# Patient Record
Sex: MALE | Race: WHITE | NOT HISPANIC OR LATINO | Employment: FULL TIME | URBAN - METROPOLITAN AREA
[De-identification: names, ages, dates, MRNs, and addresses within clinical notes are randomized per-mention and may not be internally consistent; named-entity substitution may affect disease eponyms.]

---

## 2022-05-10 ENCOUNTER — OFFICE VISIT (OUTPATIENT)
Dept: FAMILY MEDICINE | Facility: CLINIC | Age: 32
End: 2022-05-10
Payer: COMMERCIAL

## 2022-05-10 VITALS
HEART RATE: 63 BPM | TEMPERATURE: 99 F | WEIGHT: 212.5 LBS | SYSTOLIC BLOOD PRESSURE: 118 MMHG | HEIGHT: 76 IN | DIASTOLIC BLOOD PRESSURE: 62 MMHG | BODY MASS INDEX: 25.88 KG/M2 | OXYGEN SATURATION: 98 % | RESPIRATION RATE: 18 BRPM

## 2022-05-10 DIAGNOSIS — K64.4 EXTERNAL HEMORRHOID, BLEEDING: Primary | ICD-10-CM

## 2022-05-10 PROCEDURE — 99999 PR PBB SHADOW E&M-NEW PATIENT-LVL III: CPT | Mod: PBBFAC,,, | Performed by: FAMILY MEDICINE

## 2022-05-10 PROCEDURE — 99204 PR OFFICE/OUTPT VISIT, NEW, LEVL IV, 45-59 MIN: ICD-10-PCS | Mod: S$GLB,,, | Performed by: FAMILY MEDICINE

## 2022-05-10 PROCEDURE — 3008F BODY MASS INDEX DOCD: CPT | Mod: CPTII,S$GLB,, | Performed by: FAMILY MEDICINE

## 2022-05-10 PROCEDURE — 3074F PR MOST RECENT SYSTOLIC BLOOD PRESSURE < 130 MM HG: ICD-10-PCS | Mod: CPTII,S$GLB,, | Performed by: FAMILY MEDICINE

## 2022-05-10 PROCEDURE — 99204 OFFICE O/P NEW MOD 45 MIN: CPT | Mod: S$GLB,,, | Performed by: FAMILY MEDICINE

## 2022-05-10 PROCEDURE — 3008F PR BODY MASS INDEX (BMI) DOCUMENTED: ICD-10-PCS | Mod: CPTII,S$GLB,, | Performed by: FAMILY MEDICINE

## 2022-05-10 PROCEDURE — 3078F DIAST BP <80 MM HG: CPT | Mod: CPTII,S$GLB,, | Performed by: FAMILY MEDICINE

## 2022-05-10 PROCEDURE — 99999 PR PBB SHADOW E&M-NEW PATIENT-LVL III: ICD-10-PCS | Mod: PBBFAC,,, | Performed by: FAMILY MEDICINE

## 2022-05-10 PROCEDURE — 3074F SYST BP LT 130 MM HG: CPT | Mod: CPTII,S$GLB,, | Performed by: FAMILY MEDICINE

## 2022-05-10 PROCEDURE — 3078F PR MOST RECENT DIASTOLIC BLOOD PRESSURE < 80 MM HG: ICD-10-PCS | Mod: CPTII,S$GLB,, | Performed by: FAMILY MEDICINE

## 2022-05-10 RX ORDER — MUPIROCIN 20 MG/G
OINTMENT TOPICAL 3 TIMES DAILY
Qty: 30 G | Refills: 0 | Status: SHIPPED | OUTPATIENT
Start: 2022-05-10

## 2022-05-10 RX ORDER — HYDROCORTISONE 25 MG/G
OINTMENT TOPICAL 2 TIMES DAILY
Qty: 28.35 G | Refills: 0 | Status: SHIPPED | OUTPATIENT
Start: 2022-05-10

## 2022-05-10 RX ORDER — DOCUSATE SODIUM 100 MG/1
100 CAPSULE, LIQUID FILLED ORAL 2 TIMES DAILY PRN
Qty: 60 CAPSULE | Refills: 1 | Status: SHIPPED | OUTPATIENT
Start: 2022-05-10

## 2022-05-10 NOTE — PROGRESS NOTES
Health Maintenance Due   Topic     Hepatitis C Screening  Consult pcp      Lipid Panel  Consult pcp        HIV Screening      TETANUS VACCINE      COVID-19 Vaccine (2 - Pfizer series)

## 2022-05-11 ENCOUNTER — OFFICE VISIT (OUTPATIENT)
Dept: SURGERY | Facility: CLINIC | Age: 32
End: 2022-05-11
Payer: COMMERCIAL

## 2022-05-11 VITALS
BODY MASS INDEX: 25.98 KG/M2 | SYSTOLIC BLOOD PRESSURE: 121 MMHG | HEIGHT: 76 IN | WEIGHT: 213.38 LBS | DIASTOLIC BLOOD PRESSURE: 67 MMHG | HEART RATE: 74 BPM

## 2022-05-11 DIAGNOSIS — K64.4 EXTERNAL HEMORRHOID, BLEEDING: ICD-10-CM

## 2022-05-11 DIAGNOSIS — K64.5 THROMBOSED EXTERNAL HEMORRHOID: ICD-10-CM

## 2022-05-11 PROCEDURE — 99999 PR PBB SHADOW E&M-EST. PATIENT-LVL III: ICD-10-PCS | Mod: PBBFAC,,, | Performed by: COLON & RECTAL SURGERY

## 2022-05-11 PROCEDURE — 3078F DIAST BP <80 MM HG: CPT | Mod: CPTII,S$GLB,, | Performed by: COLON & RECTAL SURGERY

## 2022-05-11 PROCEDURE — 46320 REMOVAL OF HEMORRHOID CLOT: CPT | Mod: S$GLB,,, | Performed by: COLON & RECTAL SURGERY

## 2022-05-11 PROCEDURE — 99203 PR OFFICE/OUTPT VISIT, NEW, LEVL III, 30-44 MIN: ICD-10-PCS | Mod: 25,S$GLB,, | Performed by: COLON & RECTAL SURGERY

## 2022-05-11 PROCEDURE — 3078F PR MOST RECENT DIASTOLIC BLOOD PRESSURE < 80 MM HG: ICD-10-PCS | Mod: CPTII,S$GLB,, | Performed by: COLON & RECTAL SURGERY

## 2022-05-11 PROCEDURE — 1159F MED LIST DOCD IN RCRD: CPT | Mod: CPTII,S$GLB,, | Performed by: COLON & RECTAL SURGERY

## 2022-05-11 PROCEDURE — 3074F PR MOST RECENT SYSTOLIC BLOOD PRESSURE < 130 MM HG: ICD-10-PCS | Mod: CPTII,S$GLB,, | Performed by: COLON & RECTAL SURGERY

## 2022-05-11 PROCEDURE — 88304 PR  SURG PATH,LEVEL III: ICD-10-PCS | Mod: 26,,, | Performed by: PATHOLOGY

## 2022-05-11 PROCEDURE — 1159F PR MEDICATION LIST DOCUMENTED IN MEDICAL RECORD: ICD-10-PCS | Mod: CPTII,S$GLB,, | Performed by: COLON & RECTAL SURGERY

## 2022-05-11 PROCEDURE — 99999 PR PBB SHADOW E&M-EST. PATIENT-LVL III: CPT | Mod: PBBFAC,,, | Performed by: COLON & RECTAL SURGERY

## 2022-05-11 PROCEDURE — 3008F BODY MASS INDEX DOCD: CPT | Mod: CPTII,S$GLB,, | Performed by: COLON & RECTAL SURGERY

## 2022-05-11 PROCEDURE — 46320 PR EXCISION THROMBOSED HEMORRHOID, EXTERNAL: ICD-10-PCS | Mod: S$GLB,,, | Performed by: COLON & RECTAL SURGERY

## 2022-05-11 PROCEDURE — 3074F SYST BP LT 130 MM HG: CPT | Mod: CPTII,S$GLB,, | Performed by: COLON & RECTAL SURGERY

## 2022-05-11 PROCEDURE — 88304 TISSUE EXAM BY PATHOLOGIST: CPT | Performed by: PATHOLOGY

## 2022-05-11 PROCEDURE — 99203 OFFICE O/P NEW LOW 30 MIN: CPT | Mod: 25,S$GLB,, | Performed by: COLON & RECTAL SURGERY

## 2022-05-11 PROCEDURE — 3008F PR BODY MASS INDEX (BMI) DOCUMENTED: ICD-10-PCS | Mod: CPTII,S$GLB,, | Performed by: COLON & RECTAL SURGERY

## 2022-05-11 PROCEDURE — 88304 TISSUE EXAM BY PATHOLOGIST: CPT | Mod: 26,,, | Performed by: PATHOLOGY

## 2022-05-11 NOTE — PROGRESS NOTES
"CRS Office Visit History and Physical    Referring Md:   Aaareferral Self  No address on file    SUBJECTIVE:     Chief Complaint: anal pain, hemorrhoids    History of Present Illness:  Patient is a 31 y.o. male presents with pain and swelling starting last FRI.  Started seeing blood over weekend - [facility].  Swelling better, pain improved  BMs generally easy, 1-2 per day.  No straining.  No protrusion      No past medical history on file.    No past surgical history on file.    Review of patient's allergies indicates:   Allergen Reactions    Penicillins Hives and Rash       Current Outpatient Medications on File Prior to Visit   Medication Sig Dispense Refill    docusate sodium (COLACE) 100 MG capsule Take 1 capsule (100 mg total) by mouth 2 (two) times daily as needed for Constipation. 60 capsule 1    hydrocortisone 2.5 % ointment Apply topically 2 (two) times daily. 28.35 g 0    mupirocin (BACTROBAN) 2 % ointment Apply topically 3 (three) times daily. 30 g 0     No current facility-administered medications on file prior to visit.       No family history on file.          Review of Systems:  ROS:  GENERAL: No fever, chills, fatigability or weight loss.  Integument:  No rashes, redness, icterus  CHEST: Denies DAVIS, cyanosis, wheezing, cough and sputum production.  CARDIOVASCULAR: Denies chest pain, PND, orthopnea or reduced exercise tolerance.  GI:  Denies abd pain, dysphagia, nausea, vomiting, no hematemesis, no rectal pain  : Denies burning on urination, no hematuria, no bacteriuria  MSK:  No deformities, swelling, joint pain swelling  Neurologic:  No HAs, seizures, weakness, paresthesias, gait problems      OBJECTIVE:     Vital Signs (Most Recent)  /67 (BP Location: Left arm, Patient Position: Sitting, BP Method: Medium (Automatic))   Pulse 74   Ht 6' 4" (1.93 m)   Wt 96.8 kg (213 lb 6.5 oz)   BMI 25.98 kg/m²     Physical Exam:  General: White male in no distress   Skin/ Sclera anicteric  HEENT: " anicteric, normocephalic, extraocular movements intact   Neck trachea midline  Chest symmetric, nl excursions, no retractions  Respiratory: respirations are even and unlabored     Anorectal:   Inspection       Thrombosed external hemorrhoid, right lateral position  Extremities: Warm dry and intact.  NO CCE  Neuro: alert and oriented x 4.  Moves all extremities.         ASSESSMENT/PLAN:   Thrombosed external hemorrhoid      Recommend  Thrombosis removed - see below  Soak BID  OV in 6 weeks    Procedure note  Removed thrombosed external hemorrhoid  Moshe/ Mahesh  Tolerated procedure well

## 2022-05-16 NOTE — PROGRESS NOTES
Subjective:       Patient ID: Mumtaz Barragan is a 31 y.o. male.    Chief Complaint: Hemorrhoids      HPI  31-year-old male presents for hemorrhoid.  Patient feels he has external hemorrhoid has been occasionally bleeding.  States he had this in the past.      Review of Systems   Constitutional: Negative.  Negative for activity change and unexpected weight change.   HENT: Negative.  Negative for hearing loss, rhinorrhea and trouble swallowing.    Eyes: Negative for discharge and visual disturbance.   Respiratory: Negative.  Negative for chest tightness and wheezing.    Cardiovascular: Negative.  Negative for chest pain and palpitations.   Gastrointestinal: Positive for blood in stool and diarrhea. Negative for constipation and vomiting.   Endocrine: Negative.  Negative for polydipsia and polyuria.   Genitourinary: Negative.  Negative for difficulty urinating, hematuria and urgency.   Musculoskeletal: Negative.  Negative for arthralgias, joint swelling and neck pain.   Neurological: Negative.  Negative for weakness and headaches.   Psychiatric/Behavioral: Negative.  Negative for confusion and dysphoric mood.          History reviewed. No pertinent past medical history.  History reviewed. No pertinent surgical history.  History reviewed. No pertinent family history.  Social History     Socioeconomic History    Marital status: Single       Current Outpatient Medications:     docusate sodium (COLACE) 100 MG capsule, Take 1 capsule (100 mg total) by mouth 2 (two) times daily as needed for Constipation., Disp: 60 capsule, Rfl: 1    hydrocortisone 2.5 % ointment, Apply topically 2 (two) times daily., Disp: 28.35 g, Rfl: 0    mupirocin (BACTROBAN) 2 % ointment, Apply topically 3 (three) times daily., Disp: 30 g, Rfl: 0   Objective:      Vitals:    05/10/22 0940   BP: 118/62   BP Location: Right arm   Patient Position: Sitting   BP Method: Small (Manual)   Pulse: 63   Resp: 18   Temp: 98.6 °F (37 °C)   TempSrc: Oral   SpO2:  "98%   Weight: 96.4 kg (212 lb 8.4 oz)   Height: 6' 4" (1.93 m)       Physical Exam  Constitutional:       General: He is not in acute distress.     Appearance: He is not diaphoretic.   HENT:      Head: Normocephalic and atraumatic.   Eyes:      Conjunctiva/sclera: Conjunctivae normal.   Pulmonary:      Effort: Pulmonary effort is normal.   Genitourinary:     Rectum: External hemorrhoid present.   Musculoskeletal:         General: Normal range of motion.      Cervical back: Neck supple.   Skin:     Findings: No rash.   Neurological:      Mental Status: He is alert and oriented to person, place, and time.   Psychiatric:         Behavior: Behavior normal.         Thought Content: Thought content normal.         Judgment: Judgment normal.            Assessment:       1. External hemorrhoid, bleeding        Plan:       External hemorrhoid, bleeding  -     Ambulatory referral/consult to Gastroenterology; Future; Expected date: 05/17/2022  -     docusate sodium (COLACE) 100 MG capsule; Take 1 capsule (100 mg total) by mouth 2 (two) times daily as needed for Constipation.  Dispense: 60 capsule; Refill: 1  -     hydrocortisone 2.5 % ointment; Apply topically 2 (two) times daily.  Dispense: 28.35 g; Refill: 0  -     mupirocin (BACTROBAN) 2 % ointment; Apply topically 3 (three) times daily.  Dispense: 30 g; Refill: 0    Given steroid cream a patient was advised to follow-up with GI            Patient note was created using [employer]ue[employer].  Any errors in syntax or even information may not have been identified and edited on initial review prior to signing this note.  "

## 2022-05-17 LAB
FINAL PATHOLOGIC DIAGNOSIS: NORMAL
Lab: NORMAL

## 2022-06-21 NOTE — PROGRESS NOTES
"  PI:  Mumtaz Barragan is a 31 y.o. male with history of thrombosed external hemorrhoid    Feels well.  No further pain, bleeding  BMs regular.  No prolapse      No past medical history on file.     No past surgical history on file.    Review of patient's allergies indicates:   Allergen Reactions    Penicillins Hives and Rash       No family history on file.    Social History     Socioeconomic History    Marital status: Single       ROS:  GENERAL: No fever, chills, fatigability or weight loss.  Integument: No rashes, redness, icterus  CHEST: Denies DAVIS, cyanosis, wheezing, cough and sputum production.  CARDIOVASCULAR: Denies chest pain, PND, orthopnea or reduced exercise tolerance.  GI: Denies abd pain, dysphagia, nausea, vomiting, no hematemesis   : Denies burning on urination, no hematuria, no bacteriuria  MSK: No deformities, swelling, joint pain swelling  Neurologic: No HAs, seizures, weakness, paresthesias, gait problems    PE:  General appearance well  BP (!) 144/75 (BP Location: Right arm, Patient Position: Sitting, BP Method: Large (Automatic))   Pulse 64   Ht 6' 4" (1.93 m)   Wt 94.8 kg (209 lb)   BMI 25.44 kg/m²     Sclera/ Skin anicteric  AT NC EOMI  Neck supple trachea midline   Chest symmetric, nl excursion, no retractions, breathing comfortably  EXT - no CCE  Neuro:  Mood/ affect nl, alert and oriented x 3, moves all ext's, gait nl    Rectal  Inspection           MARY nl tone, no mass      Assessment:  Resolved external thrombosed hemorrhoid    Plan:  High fiber diet - 25 grams per day.  Emphasis on whole grain breads such as double fiber wheat bread and high fiber cereals and bars.    Drink 8 glasses of water per day 64 - 96 oz per day  Fiber supplements such as KONSYL, METAMUCIL can be taken 1-2 x per day  Regular exercise - 30 min brisk walking 5 days per week      Anoscopy Procedure Note:   Verbal consent obtained    Indications:  History of thrombosed ext hemorrhoid    Post procedure " diagnosis:  Mild internal hemorrhoids    Procedure: anoscopy    Surgeon REKHA    Assistant: MA    Specimen none     Findings:  Resolved external hemorrhoid.  Wound healed.      Right posterior hemorrhoid grade 1  Right anterior hemorrhoid grade 1  Left lateral hemorrhoid grade 2    Pt tolerated procedure well.      No complications.

## 2022-06-22 ENCOUNTER — OFFICE VISIT (OUTPATIENT)
Dept: SURGERY | Facility: CLINIC | Age: 32
End: 2022-06-22
Payer: COMMERCIAL

## 2022-06-22 VITALS
WEIGHT: 209 LBS | SYSTOLIC BLOOD PRESSURE: 144 MMHG | HEIGHT: 76 IN | HEART RATE: 64 BPM | DIASTOLIC BLOOD PRESSURE: 75 MMHG | BODY MASS INDEX: 25.45 KG/M2

## 2022-06-22 DIAGNOSIS — K64.5 THROMBOSED EXTERNAL HEMORRHOIDS: Primary | ICD-10-CM

## 2022-06-22 PROCEDURE — 99213 OFFICE O/P EST LOW 20 MIN: CPT | Mod: 25,S$GLB,, | Performed by: COLON & RECTAL SURGERY

## 2022-06-22 PROCEDURE — 1159F PR MEDICATION LIST DOCUMENTED IN MEDICAL RECORD: ICD-10-PCS | Mod: CPTII,S$GLB,, | Performed by: COLON & RECTAL SURGERY

## 2022-06-22 PROCEDURE — 46600 PR DIAG2STIC A2SCOPY: ICD-10-PCS | Mod: S$GLB,,, | Performed by: COLON & RECTAL SURGERY

## 2022-06-22 PROCEDURE — 3077F PR MOST RECENT SYSTOLIC BLOOD PRESSURE >= 140 MM HG: ICD-10-PCS | Mod: CPTII,S$GLB,, | Performed by: COLON & RECTAL SURGERY

## 2022-06-22 PROCEDURE — 3008F PR BODY MASS INDEX (BMI) DOCUMENTED: ICD-10-PCS | Mod: CPTII,S$GLB,, | Performed by: COLON & RECTAL SURGERY

## 2022-06-22 PROCEDURE — 99213 PR OFFICE/OUTPT VISIT, EST, LEVL III, 20-29 MIN: ICD-10-PCS | Mod: 25,S$GLB,, | Performed by: COLON & RECTAL SURGERY

## 2022-06-22 PROCEDURE — 3008F BODY MASS INDEX DOCD: CPT | Mod: CPTII,S$GLB,, | Performed by: COLON & RECTAL SURGERY

## 2022-06-22 PROCEDURE — 3078F DIAST BP <80 MM HG: CPT | Mod: CPTII,S$GLB,, | Performed by: COLON & RECTAL SURGERY

## 2022-06-22 PROCEDURE — 3078F PR MOST RECENT DIASTOLIC BLOOD PRESSURE < 80 MM HG: ICD-10-PCS | Mod: CPTII,S$GLB,, | Performed by: COLON & RECTAL SURGERY

## 2022-06-22 PROCEDURE — 99999 PR PBB SHADOW E&M-EST. PATIENT-LVL III: ICD-10-PCS | Mod: PBBFAC,,, | Performed by: COLON & RECTAL SURGERY

## 2022-06-22 PROCEDURE — 99999 PR PBB SHADOW E&M-EST. PATIENT-LVL III: CPT | Mod: PBBFAC,,, | Performed by: COLON & RECTAL SURGERY

## 2022-06-22 PROCEDURE — 3077F SYST BP >= 140 MM HG: CPT | Mod: CPTII,S$GLB,, | Performed by: COLON & RECTAL SURGERY

## 2022-06-22 PROCEDURE — 46600 DIAGNOSTIC ANOSCOPY SPX: CPT | Mod: S$GLB,,, | Performed by: COLON & RECTAL SURGERY

## 2022-06-22 PROCEDURE — 1159F MED LIST DOCD IN RCRD: CPT | Mod: CPTII,S$GLB,, | Performed by: COLON & RECTAL SURGERY

## 2023-10-16 ENCOUNTER — PATIENT MESSAGE (OUTPATIENT)
Dept: ADMINISTRATIVE | Facility: HOSPITAL | Age: 33
End: 2023-10-16
Payer: COMMERCIAL

## 2023-10-16 ENCOUNTER — PATIENT OUTREACH (OUTPATIENT)
Dept: ADMINISTRATIVE | Facility: HOSPITAL | Age: 33
End: 2023-10-16
Payer: COMMERCIAL

## 2023-10-16 NOTE — PROGRESS NOTES
Population Health Chart Review & Patient Outreach Details:     Reason for Outreach Encounter:     [x]  Non-Compliant Report   []  Payor Report ([employer], PHN, BCBS, MSSP, MCIP, UHC, etc.)   []  Pre-Visit Chart Review     Updates Requested / Reviewed:     [x]  Care Everywhere    []     []  External Sources ([facility], [facility], DIS, etc.)   []  Care Team Updated    Patient Outreach Method:    [x]  Telephone Outreach Completed   [] Successful   [x] Left Voicemail   [] Unable to Contact (wrong number, no voicemail)  [x]  [facility] Outreach Sent  []  Letter Outreach Mailed  []  Fax Sent for External Records  []  External Records Upload    Health Maintenance Topics Addressed and Outreach Outcomes / Actions Taken:        []      Breast Cancer Screening []  Mammo Scheduled      []  External Records Requested     []  Added Reminder to Complete to Upcoming Primary Care Appt Notes     []  Patient Declined     []  Patient Will Call Back to Schedule     []  Patient Will Schedule with External Provider / Order Routed if Applicable             []       Cervical Cancer Screening []  Pap Scheduled      []  External Records Requested     []  Added Reminder to Complete to Upcoming Primary Care Appt Notes     []  Patient Declined     []  Patient Will Call Back to Schedule     []  Patient Will Schedule with External Provider               []          Colorectal Cancer Screening []  Colonoscopy Case Request or Referral Placed     []  External Records Requested     []  Added Reminder to Complete to Upcoming Primary Care Appt Notes     []  Patient Declined     []  Patient Will Call Back to Schedule     []  Patient Will Schedule with External Provider     []  Fit Kit Mailed (add the SmartPhrase under additional notes)     []  Reminded Patient to Complete Home Test             []      Diabetic Eye Exam []  Eye Camera Scheduled or Optometry Referral Placed     []  External Records Requested     []  Added Reminder to Complete to  Upcoming Primary Care Appt Notes     []  Patient Declined     []  Patient Will Call Back to Schedule     []  Patient Will Schedule with External Provider             []      Blood Pressure Control []  Primary Care Follow Up Visit Scheduled     []  Remote Blood Pressure Reading Captured     []  Added Reminder to Complete to Upcoming Primary Care Appt Notes     []  Patient Declined     []  Patient Will Call Back / Patient Will Send Portal Message with Reading     []  Patient Will Call Back to Schedule Provider Visit             []       HbA1c & Other Labs []  Lab Appt Scheduled for Due Labs     []  Primary Care Follow Up Visit Scheduled      []  Reminded Patient to Complete Home Test     []  Added Reminder to Complete to Upcoming Primary Care Appt Notes     []  Patient Declined     []  Patient Will Call Back to Schedule     []  Patient Will Schedule with External Provider / Order Routed if Applicable           [x]    Schedule Primary Care Appt []  Primary Care Appt Scheduled     []  Patient Declined     []  Patient Will Call Back to Schedule     []  Pt Established with External Provider & Updated Care Team             []      Medication Adherence []  Primary Care Appointment Scheduled     []  Added Reminder to Upcoming Primary Care Appt Notes     []  Patient Reminded to  Prescription     []  Patient Declined, Provider Notified if Needed     []  Sent Provider Message to Review and/or Add Exclusion to Problem List             []      Osteoporosis Screening []  DXA Appointment Scheduled     []  External Records Requested     []  Added Reminder to Complete to Upcoming Primary Care Appt Notes     []  Patient Declined     []  Patient Will Call Back to Schedule     []  Patient Will Schedule with External Provider / Order Routed if Applicable     Additional Care Coordinator Notes:         Further Action Needed If Patient Returns Outreach:     Need to schedule visit with pcp.

## 2024-10-08 NOTE — PROGRESS NOTES
Subjective:          Chief Complaint: Mumtaz Barragan is a 33 y.o. male who had no chief complaint listed for this encounter.    Mumtaz Barragan is a 33 y.o. male,  here for evaluation of his bilateral posterior Hip. The pain started 3-4 months months ago after playing squash and is becoming progressively worse. Pain is located over (points to) ischium. He reports that the pain is a 6 /10 aching pain today. He reports none previous treatments. Pain is affecting ADLs and limiting desired level of activity. Denies numbness, tingling, radiation, and inability to bear weight.  Pain is 6 /10 at its worst    Mechanical symptoms: none  Subjective instability: (--)   Worse with playing squash  Better with rest  Nocturnal symptoms: (--)    No previous surgeries or trauma             Review of Systems   Constitutional: Negative for chills and fever.   HENT:  Negative for congestion and sore throat.    Eyes:  Negative for discharge and double vision.   Cardiovascular:  Negative for chest pain, palpitations and syncope.   Respiratory:  Negative for cough and shortness of breath.    Endocrine: Negative for cold intolerance and heat intolerance.   Skin:  Negative for dry skin and rash.   Musculoskeletal:  Positive for joint pain.   Gastrointestinal:  Negative for abdominal pain, nausea and vomiting.   Neurological:  Negative for focal weakness, numbness and paresthesias.   All other systems reviewed and are negative.                  Objective:        General: Mumtaz is well-developed, well-nourished, appears stated age, in no acute distress, alert and oriented to time, place and person.         General Musculoskeletal Exam   Gait: normal   Pelvic Obliquity: none        Right Hip Exam     Range of Motion   Abduction:  30   Adduction:  20   Extension:  0   Flexion:  120   External rotation:  30   Internal rotation:  10     Muscle Strength   The patient has normal right hip strength.    Tests   Pain w/ forced internal  rotation (PREM): absent  Pain w/ forced external rotation (FADIR): absent    Other   Sensation: normal    Comments:  Popliteal angle 15 degrees  Right posterior thigh tenderness  Left Hip Exam     Range of Motion   Abduction:  30   Adduction:  20   Extension:  0   Flexion:  120   External rotation:  30   Internal rotation: 10     Muscle Strength   The patient has normal left hip strength.     Tests   Pain w/ forced internal rotation (PREM): absent  Pain w/ forced external rotation (FADIR): absent    Other   Sensation: normal    Comments:  Popliteal angle 15 degrees          Vascular Exam     Right Pulses  Dorsalis Pedis:      2+  Posterior Tibial:      2+        Left Pulses  Dorsalis Pedis:      2+  Posterior Tibial:      2+              Assessment:       No diagnosis found.       Plan:       1. RTC in as needed with Dr. João Mathews. Arriaga Hip Score was filled out today in clinic. Patient will fill out Arriaga Hip Score on return.     2. Medications: Refills of the following Rx were sent to patients preferred Pharmacy:  Meloxicam 15mg Tab    3. [facility]       4. HEP: N/A    5. Procedures/Procedural Planning:   N/A    6. DME: N/A    7. 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 Visit Summary: Patient complains of Bilateral Hamstring tendinitis and right hamstring sprain for last 3-4 months. He has been referred to PT, and to Dr. Waterman for OMT therapy and a prescription of Meloxicam has been given.                       [facility] patient questionnaires have been collected today.

## 2024-10-09 ENCOUNTER — OFFICE VISIT (OUTPATIENT)
Dept: SPORTS MEDICINE | Facility: CLINIC | Age: 34
End: 2024-10-09
Payer: COMMERCIAL

## 2024-10-09 ENCOUNTER — HOSPITAL ENCOUNTER (OUTPATIENT)
Dept: RADIOLOGY | Facility: HOSPITAL | Age: 34
Discharge: HOME | End: 2024-10-09
Attending: ORTHOPAEDIC SURGERY
Payer: COMMERCIAL

## 2024-10-09 VITALS
DIASTOLIC BLOOD PRESSURE: 82 MMHG | SYSTOLIC BLOOD PRESSURE: 138 MMHG | WEIGHT: 210 LBS | HEIGHT: 76 IN | BODY MASS INDEX: 25.57 KG/M2 | HEART RATE: 69 BPM

## 2024-10-09 DIAGNOSIS — M89.8X5 PAIN OF LEFT FEMUR: ICD-10-CM

## 2024-10-09 DIAGNOSIS — M76.899 HAMSTRING TENDINITIS: Primary | ICD-10-CM

## 2024-10-09 PROCEDURE — 99204 OFFICE O/P NEW MOD 45 MIN: CPT | Mod: S$GLB,,, | Performed by: ORTHOPAEDIC SURGERY

## 2024-10-09 PROCEDURE — 1159F MED LIST DOCD IN RCRD: CPT | Mod: CPTII,S$GLB,, | Performed by: ORTHOPAEDIC SURGERY

## 2024-10-09 PROCEDURE — 73552 X-RAY EXAM OF FEMUR 2/>: CPT | Mod: 26,LT,, | Performed by: RADIOLOGY

## 2024-10-09 PROCEDURE — 3079F DIAST BP 80-89 MM HG: CPT | Mod: CPTII,S$GLB,, | Performed by: ORTHOPAEDIC SURGERY

## 2024-10-09 PROCEDURE — 99999 PR PBB SHADOW E&M-EST. PATIENT-LVL III: CPT | Mod: PBBFAC,,, | Performed by: ORTHOPAEDIC SURGERY

## 2024-10-09 PROCEDURE — 73552 X-RAY EXAM OF FEMUR 2/>: CPT | Mod: TC,LT

## 2024-10-09 PROCEDURE — 3075F SYST BP GE 130 - 139MM HG: CPT | Mod: CPTII,S$GLB,, | Performed by: ORTHOPAEDIC SURGERY

## 2024-10-09 PROCEDURE — 3008F BODY MASS INDEX DOCD: CPT | Mod: CPTII,S$GLB,, | Performed by: ORTHOPAEDIC SURGERY

## 2024-10-09 RX ORDER — MELOXICAM 15 MG/1
15 TABLET ORAL DAILY
Qty: 30 TABLET | Refills: 1 | Status: SHIPPED | OUTPATIENT
Start: 2024-10-09 | End: 2024-12-08

## 2024-10-10 ENCOUNTER — OFFICE VISIT (OUTPATIENT)
Dept: SPORTS MEDICINE | Facility: CLINIC | Age: 34
End: 2024-10-10
Payer: COMMERCIAL

## 2024-10-10 VITALS
SYSTOLIC BLOOD PRESSURE: 118 MMHG | WEIGHT: 210.13 LBS | BODY MASS INDEX: 25.59 KG/M2 | HEART RATE: 77 BPM | HEIGHT: 76 IN | DIASTOLIC BLOOD PRESSURE: 76 MMHG

## 2024-10-10 DIAGNOSIS — M54.59 MECHANICAL LOW BACK PAIN: Primary | ICD-10-CM

## 2024-10-10 DIAGNOSIS — M99.02 SOMATIC DYSFUNCTION OF THORACIC REGION: ICD-10-CM

## 2024-10-10 DIAGNOSIS — M99.07 SOMATIC DYSFUNCTION OF UPPER EXTREMITY: ICD-10-CM

## 2024-10-10 DIAGNOSIS — M76.899 HAMSTRING TENDINITIS: ICD-10-CM

## 2024-10-10 DIAGNOSIS — M99.04 SOMATIC DYSFUNCTION OF SACRAL REGION: ICD-10-CM

## 2024-10-10 DIAGNOSIS — M99.05 SOMATIC DYSFUNCTION OF PELVIS REGION: ICD-10-CM

## 2024-10-10 DIAGNOSIS — R29.898 POOR BODY MECHANICS: ICD-10-CM

## 2024-10-10 DIAGNOSIS — M99.08 SOMATIC DYSFUNCTION OF RIB CAGE REGION: ICD-10-CM

## 2024-10-10 DIAGNOSIS — M99.06 SOMATIC DYSFUNCTION OF LOWER EXTREMITY: ICD-10-CM

## 2024-10-10 DIAGNOSIS — M99.03 SOMATIC DYSFUNCTION OF LUMBAR REGION: ICD-10-CM

## 2024-10-10 PROCEDURE — 98928 OSTEOPATH MANJ 7-8 REGIONS: CPT | Mod: S$GLB,,, | Performed by: ORTHOPAEDIC SURGERY

## 2024-10-10 PROCEDURE — 1160F RVW MEDS BY RX/DR IN RCRD: CPT | Mod: CPTII,S$GLB,, | Performed by: ORTHOPAEDIC SURGERY

## 2024-10-10 PROCEDURE — 3008F BODY MASS INDEX DOCD: CPT | Mod: CPTII,S$GLB,, | Performed by: ORTHOPAEDIC SURGERY

## 2024-10-10 PROCEDURE — 3078F DIAST BP <80 MM HG: CPT | Mod: CPTII,S$GLB,, | Performed by: ORTHOPAEDIC SURGERY

## 2024-10-10 PROCEDURE — 97110 THERAPEUTIC EXERCISES: CPT | Mod: S$GLB,,, | Performed by: ORTHOPAEDIC SURGERY

## 2024-10-10 PROCEDURE — 99215 OFFICE O/P EST HI 40 MIN: CPT | Mod: 25,S$GLB,, | Performed by: ORTHOPAEDIC SURGERY

## 2024-10-10 PROCEDURE — 3074F SYST BP LT 130 MM HG: CPT | Mod: CPTII,S$GLB,, | Performed by: ORTHOPAEDIC SURGERY

## 2024-10-10 PROCEDURE — 1159F MED LIST DOCD IN RCRD: CPT | Mod: CPTII,S$GLB,, | Performed by: ORTHOPAEDIC SURGERY

## 2024-10-10 PROCEDURE — 99999 PR PBB SHADOW E&M-EST. PATIENT-LVL III: CPT | Mod: PBBFAC,,, | Performed by: ORTHOPAEDIC SURGERY

## 2024-10-10 NOTE — PROGRESS NOTES
CC: bilateral hamstring, left low back pain     33 y.o. Male presents today for evaluation of his bilateral hamstring and left low back pain. He was referred by Dr. Mathews for consideration of possible OMT. He admits to right proximal hamstring pain beginning a few months ago he attributes to a few days in a row of playing squash and HIIT training on the bike. He also admits to waking up with left low back and left proximal hamstring pain beginning 1.5 weeks ago without mechanism of injury.   How long: Months  What makes it better: Rest  What makes it worse: Exercise activity   Does it radiate: Denies  Attempted treatments: Rest, ice,   Pain score: 2/10   Any mechanical symptoms: Denies  Feelings of instability: Denies   Affecting ADLs: Denies     Occupation: Banking     PAST MEDICAL HISTORY:   History reviewed. No pertinent past medical history.    PAST SURGICAL HISTORY:   History reviewed. No pertinent surgical history.    FAMILY HISTORY:   No family history on file.    SOCIAL HISTORY:   Social History     Socioeconomic History    Marital status: Single     Social Drivers of Health     Financial Resource Strain: Patient Declined (10/8/2024)    Overall Financial Resource Strain (CARDIA)     Difficulty of Paying Living Expenses: Patient declined   Food Insecurity: Patient Declined (10/8/2024)    Hunger Vital Sign     Worried About Running Out of Food in the Last Year: Patient declined     Ran Out of Food in the Last Year: Patient declined   Physical Activity: Sufficiently Active (10/8/2024)    Exercise Vital Sign     Days of Exercise per Week: 6 days     Minutes of Exercise per Session: 40 min   Stress: No Stress Concern Present (10/8/2024)    [employer] - Occupational Stress Questionnaire     Feeling of Stress : Not at all   Housing Stability: Unknown (10/8/2024)    Housing Stability Vital Sign     Unable to Pay for Housing in the Last Year: No       MEDICATIONS:     Current Outpatient  "Medications:     meloxicam (MOBIC) 15 MG tablet, Take 1 tablet (15 mg total) by mouth once daily., Disp: 30 tablet, Rfl: 1    ALLERGIES:   Review of patient's allergies indicates:   Allergen Reactions    Penicillins Hives and Rash        PHYSICAL EXAMINATION:  /76   Pulse 77   Ht 6' 4" (1.93 m)   Wt 95.3 kg (210 lb 1.6 oz)   BMI 25.57 kg/m²   Vitals signs and nursing note have been reviewed.  General: In no acute distress, well developed, well nourished, no diaphoresis  Eyes: EOM full and smooth, no eye redness or discharge  HENT: normocephalic and atraumatic, neck supple, trachea midline, no nasal discharge, no external ear redness or discharge  Cardiovascular: no LE edema  Lungs: respirations non-labored, no conversational dyspnea   Abd: non-distended, no rigidity  MSK: no amputation or deformity, no swelling of extremities  Neuro: AAOx3, CN2-12 grossly intact  Skin: No rashes, warm and dry  Psychiatric: cooperative, pleasant, mood and affect appropriate for age    Lumbar Spine: bilateral lumbar region    Observation:    Normal cervicothoracolumbar curves.    No obvious pelvic obliquity while standing.    No edema, erythema, or ecchymosis noted in lumbosacral region.    No midline skin abnormalities.    No atrophy of lower limb musculature.    Tenderness:  No tenderness throughout the lumbar spine, iliolumbar region, posterior pelvis.  No tenderness over the sacrum, piriformis, greater/lesser trochanters.  + mild tenderness over the right proximal hamstring tendon  No bony deformities or step-offs palpated.     Range of Motion:  Active flexion to 60°.   Active extension to 25°.   Active rotation to 30° bilaterally.    Active sidebending to 25° bilaterally.  Passive hip flexion to 135° bilaterally.    Passive hip internal rotation to 45° bilaterally.    Passive hip external rotation to 45° bilaterally.    All ROM testing is without pain.    Strength Testing:  Hip flexion - 5/5  Hip extension - 5/5  Knee " flexion - 5/5  Knee extension - 5/5  Dorsiflexion - 5/5  Plantarflexion - 5/5  Great toe extension - 5/5    Special Tests:  Seated straight leg raise - negative  Supine straight leg raise - negative  Slump test - negative  Provocation maneuvers exhibit no worsening of symptoms    PREM test - negative  FADIR test - negative  Log roll test - negative    Negative Pheasant test.  Matthew test normal bilaterally.      Posture:  Upright and Anterior pelvic tilt with increased lumbar lordosis  Gait: Non-antalgic     TART (Tissue texture abnormality, Asymmetry,  Restriction of motion and/or Tenderness) changes:    Head:      Cervical Spine  Thoracic Spine  Lumbar Spine   C1 Neutral T1 Neutral L1 Neutral   C2 Neutral T2 Neutral L2 Neutral   C3 Neutral T3 Neutral L3 ERSR   C4 Neutral T4 Neutral L4 ERSR   C5 Neutral T5 FRSR L5 ERSR   C6 Neutral T6 Neutral     C7 Neutral T7 Neutral       T8 Neutral       T9 ERSL       T10 ERSL       T11 ERSL       T12 Neutral       Ribs:  Inhalation restriction of the left lower ribcage    Upper Extremity:  Periscapular MFR on the right    Abdomen:    Pelvis:  Innominate:Left anterior rotation  Pubic bone:Left interior pubic shear    Sacrum:Left on Right sacral torsion    Lower Extremity:  Internal tibial torsion on the left      Key   F= Flexed   E = Extended   R = Rotated   N = Neutral   S = Sidebent   TTA = tissue texture abnormality   L/R/B (last letter) = left/right/bilateral   HTP = fascial herniated trigger point   TB = fascial trigger band   CD = fascial continuum distortion   MFR = myofascial restriction         Neurovascular Exam:  Sensation intact to light touch in the L2-S2 dermatomes bilaterally.   No pretibial edema or abnormal hair pattern of the shin.    Intact and symmetric DP and PT pulses bilaterally.  Normal gait without trendelenberg, heel walking, toe walking, and tandem walking.      IMAGIN. X-ray obtained 10/09/2024 due to right hamstring pain   2. X-ray images were  reviewed personally by me and then directly with patient.  3. FINDINGS: X-ray images obtained demonstrate no fracture or dislocation   4. IMPRESSION: As above.       ASSESSMENT:      ICD-10-CM ICD-9-CM   1. Mechanical low back pain  M54.59 724.2   2. Hamstring tendinitis  M76.899 727.09   3. Poor body mechanics  R29.898 781.99   4. Somatic dysfunction of lumbar region  M99.03 739.3   5. Somatic dysfunction of pelvis region  M99.05 739.5   6. Somatic dysfunction of lower extremity  M99.06 739.6   7. Somatic dysfunction of rib cage region  M99.08 739.8   8. Somatic dysfunction of thoracic region  M99.02 739.2   9. Somatic dysfunction of sacral region  M99.04 739.4   10. Somatic dysfunction of upper extremity  M99.07 739.7         PLAN:  1-3. Mechanical LBP/hamstring/poor body mechanics -     - Albert admits to left proximal hamstring pain beginning several months ago he attributes to playing squash for a few days in a row and HIIT training on the bike. He also notes waking up with left sided low back pain 1.5 weeks ago in addition to left proximal hamstring pain. He was referred by Dr. Mathews for consideration of possible OMT.      - XRs obtained 10/09/2024 and images were personally reviewed with the patient. See above for further detail.    - Symptoms, exam, and imaging are most consistent with hamstring irritation secondary to poor body mechanics and poor neuromuscular firing associated with this somatic dysfunction.  We discussed the importance of decreasing inflammation and strengthening and stabilizing to help promote and maintain symptom improvement/resolution.  This is commonly accomplished with a short course of an anti-inflammatory and icing in addition to osteopathic manipulation, a home exercise program or physical therapy.    - Based on his description of pain/body language and somatic dysfunction identified on exam, I discussed osteopathic manipulation as a treatment option today. He consents to evaluation  and treatment. See below.    - HEP for abdominal bracing, ant marches, diaphragmatic breathing, pelvic clocks 6-12, hamstring protocol prescribed today. Handouts provided, explained, and exercises were demonstrated as needed. Encouraged to do daily. 32549 HOME EXERCISE PROGRAM (HEP):  The patient was taught a homegoing physical therapy regimen as described above by provider with assistance of sports medicine assistant. The patient demonstrated understanding of the exercises and proper technique of their execution. This interaction took 15 minutes.     - Proceed with physical therapy as scheduled.       4-10. Somatic dysfunction of lumbar, pelvis, sacral, thoracic, lower extremity, upper extremity, ribcage regions -     - OMT 7-8 regions. Oral consent obtained. Reviewed benefits and potential side effects. OMT indicated today due to signs and symptoms as well as local and remote somatic dysfunction findings and their related neurokinetic, lymphatic, fascial and/or arteriovenous body connections. OMT techniques used: Soft Tissue, Myofascial Release, Muscle Energy, and Fascial Distortion Model. Treatment was tolerated well. Improvement noted in segmental mobility post-treatment in dysfunctional regions. There were no adverse events and no complications immediately following treatment. Advised plenty of water to help alleviate soreness.      Future planning includes - possibly more OMT if helpful and if indicated, PT modifications, advanced imaging if not improving    All questions were answered to the best of my ability and all concerns were addressed at this time.    Follow up in 3 weeks for above, or sooner if needed.      This note is dictated using the M*Modal Fluency Direct word recognition program. There are word recognition mistakes that are occasionally missed on review.      Total time spent face-to face with patient counseling or coordinating care including prognosis, differential diagnosis, risks and  benefits of treatment, instructions, compliance risk reductions as well as non-face-to-face time personally spent reviewing medial record, medical documentation, and coordination of care.     EST MINUTES X   [id number] 10-19    21331 20-29    51807 30-39    [id number] 40-54 40   NEW     13158 15-29    67259 30-44    [id number] 45-59    58595 60-74    PHONE      5-10    [id number] 11-20    [id number] 21-30

## 2024-10-23 ENCOUNTER — CLINICAL SUPPORT (OUTPATIENT)
Dept: REHABILITATION | Facility: HOSPITAL | Age: 34
End: 2024-10-23
Attending: ORTHOPAEDIC SURGERY
Payer: COMMERCIAL

## 2024-10-23 DIAGNOSIS — S76.311A STRAIN OF RIGHT HAMSTRING MUSCLE, INITIAL ENCOUNTER: Primary | ICD-10-CM

## 2024-10-23 DIAGNOSIS — R53.1 WEAKNESS: ICD-10-CM

## 2024-10-23 DIAGNOSIS — M76.899 HAMSTRING TENDINITIS: ICD-10-CM

## 2024-10-23 PROCEDURE — 97161 PT EVAL LOW COMPLEX 20 MIN: CPT | Performed by: PHYSICAL THERAPIST

## 2024-10-23 PROCEDURE — 97110 THERAPEUTIC EXERCISES: CPT | Performed by: PHYSICAL THERAPIST

## 2024-10-23 NOTE — PROGRESS NOTES
[facility]   Physical Therapy Initial Evaluation      Name: Mumtaz Barragan  [facility] Number: [id number]    Therapy Diagnosis:   Encounter Diagnosis   Name Primary?    Hamstring tendinitis         Physician: João Mathews MD    Physician Orders: PT Eval and Treat   Medical Diagnosis from Referral:   Diagnosis   M76.899 (ICD-10-CM) - Hamstring tendinitis     Evaluation Date: 10/23/2024  Authorization Period Expiration: 12/31/24  Plan of Care Expiration: 12/31/24  Progress Note Due: 11/30/24  Visit # / Visits authorized: 1/ 1   FOTO: 1/1    Precautions: Standard     Time In: 0800  Time Out: 0900  Total Appointment Time (timed & untimed codes): 60 minutes    Subjective     Date of onset: 4 months    History of current condition - Albert reports: reports pain along Right posterior hip/hamstring. Plays a lot of tennis, squash. Squash 3 x week, weight lifting 2-3 x week.  Also rides peloton.  1 x week for about 30 mins  States he is starting to have to lower back pain at times.  He denies N/T, denies radiating pain in to legs, no changes in b/b. Denies crepitus, giving out.     Falls: none    Imaging: :   EXAMINATION:  XR FEMUR 2 VIEW LEFT     CLINICAL HISTORY:  Other specified disorders of bone, thigh     TECHNIQUE:  AP and lateral views of the left femur were performed.     COMPARISON:  None}     FINDINGS:  No fracture or dislocation.  No bone destruction identified.     Impression:     See above       Prior Therapy: OMT Dr. Waterman  Social History:  lives with their family  Occupation: banking.   Prior Level of Function: independent, pain free activities.   Current Level of Function: limited due to pain    Pain:  Current 0/10, worst 8/10, best 0/10   Location: right hip, hamstring    Description: Aching and Dull  Aggravating Factors: lunging, deep squat  Easing Factors: rest, ice    Patients goals: return to sport, strengthen muscle     Medical History:   No past medical history on  file.    Surgical History:   Mumtaz Barragan  has no past surgical history on file.    Medications:   Mumtaz has a current medication list which includes the following prescription(s): meloxicam.    Allergies:   Review of patient's allergies indicates:   Allergen Reactions    Penicillins Hives and Rash        Objective      Observation: pt presents in gym. No distress noted. No bruising or deformity noted    Posture:  no significant deviation noted in stance    Functional Movements:   Gait: independent, non painful  Squat: functional, non pain ful  Lunges: pain with deep lunge R side.  SLS: no significant deviations noted bradley      Lumbar AROM Pain/Dysfunction with Movement:   Flexion 100%    Extension 100%    Right %    Left %    Right rotation 75% discomfort   Left rotation 100%      Hip Right  Left  Pain/Dysfunction with Movement    AROM PROM AROM PROM    Flexion 110 120 110 120    Extension 7 10 7 10    Abduction 40 45 40 45    External rotation 40 45 40 45    Internal Rotation 30 35 30 35       Full AROM bradley Knees    LE MMT Right Left Pain/Dysfunction with Movement   Hip flexion 5/5 5/5    Hip extension 4+/5 4+/5    Hip abduction 4/5 4/5    Hip external rotation 4/5 4/5    Knee flexion 5/5 5/5    Knee extension 5/5 5/5    Ankle DF 5 5    Ankle PF 5 5    Ankle Inv 5 5    Ankle Ev 5 5    Great Toe Ext 5 5      Hamstring stretch HHD ft-lbs   Right* left % deficit   20 deg 34.1 44.9 24%   45 deg 32.8 45.5 27%   90 deg 38.3 50.2 23.%       Flexibility:               Ely's test: R - ; L +-              Hamstrings: R 30 deg; L  + 20 deg  Matthew Test Right  Left    Iliopsoas tigh tight   Rectus Femoris  tight tight       SIJ  Right  Left    Thigh Thrust N N   Compression N N   Distraction N N   Sacral Thrust N N      Right  Left    RITO N N   PREM N N   Log Roll N N   Hip Scour N N     Neural Tension Testing:   Slump:NEG  SLR: NEG        Palpation: TPP ischial tub R side      Limitation/Restriction for FOTO   Survey    Therapist reviewed FOTO scores for Mumtaz Barragan on 10/23/2024.   FOTO documents entered into [employer] - see Media section.    Limitation Score: see media         Treatment     Total Treatment time (time-based codes) separate from Evaluation: 23 minutes      Albert received the treatments listed below:      therapeutic exercises to develop strength and ROM for 23 minutes including:  Issued hep listed in pt instructions      Patient Education and Home Exercises     Education provided:   - issued hep, timeline    Written Home Exercises Provided: yes. Exercises were reviewed and Albert was able to demonstrate them prior to the end of the session.  Albert demonstrated good  understanding of the education provided. See EMR under Patient Instructions for exercises provided during therapy sessions.    Assessment     Mumtaz is a 33 y.o. male referred to outpatient Physical Therapy with a medical diagnosis of   M76.899 (ICD-10-CM) - Hamstring tendinitis   . Patient presents with posterior hip /hamstring pain, weakness, decreased functional mobility with symptoms consistent of proximal hamstring tendinopathy. Pt would benefit from skilled PT in order to maximize function.     Patient prognosis is Excellent.   Patient will benefit from skilled outpatient Physical Therapy to address the deficits stated above and in the chart below, provide patient /family education, and to maximize patientt's level of independence.     Plan of care discussed with patient: Yes  Patient's spiritual, cultural and educational needs considered and patient is agreeable to the plan of care and goals as stated below:     Anticipated Barriers for therapy: none    Medical Necessity is demonstrated by the following  History  Co-morbidities and personal factors that may impact the plan of care [x] LOW: no personal factors / co-morbidities  [] MODERATE: 1-2 personal factors / co-morbidities  [] HIGH: 3+ personal factors / co-morbidities     Moderate / High  Support Documentation:   Co-morbidities affecting plan of care: NA     Personal Factors:   no deficits      Examination  Body Structures and Functions, activity limitations and participation restrictions that may impact the plan of care [x] LOW: addressing 1-2 elements  [] MODERATE: 3+ elements  [] HIGH: 4+ elements (please support below)     Moderate / High Support Documentation: NA      Clinical Presentation [x] LOW: stable  [] MODERATE: Evolving  [] HIGH: Unstable      Decision Making/ Complexity Score: low         Goals:  Short Term Goals: 8 weeks   Pt independent in initial hep  Pain 0/10  Equal hamstring length  Hamstring strength 90% or better  Pt returns to squash with modifications    Long Term Goals: 16 weeks   Pt independent in d/c hep  Pain 0/10  Hamstring strength 100% or better  Pt return to working out, squash without limitations  Pt reports 90% improvement in function.  Plan     Plan of care Certification: 10/23/2024 to 3/1/25.    Outpatient Physical Therapy 1-3 times weekly for 16 weeks to include the following interventions: Electrical Stimulation NMES, IFC, DN, Manual Therapy, Moist Heat/ Ice, Neuromuscular Re-ed, Patient Education, Therapeutic Activities, and Therapeutic Exercise.     Darien Whitlock, PT

## 2024-10-24 PROBLEM — S76.311A RIGHT HAMSTRING MUSCLE STRAIN: Status: ACTIVE | Noted: 2024-10-24

## 2024-10-24 PROBLEM — R53.1 WEAKNESS: Status: ACTIVE | Noted: 2024-10-24

## 2024-10-24 NOTE — PLAN OF CARE
[facility]   Physical Therapy Initial Evaluation      Name: Mumtaz Barragan  [facility] Number: [id number]    Therapy Diagnosis:   Encounter Diagnosis   Name Primary?    Hamstring tendinitis         Physician: João Mathews MD    Physician Orders: PT Eval and Treat   Medical Diagnosis from Referral:   Diagnosis   M76.899 (ICD-10-CM) - Hamstring tendinitis     Evaluation Date: 10/23/2024  Authorization Period Expiration: 12/31/24  Plan of Care Expiration: 12/31/24  Progress Note Due: 11/30/24  Visit # / Visits authorized: 1/ 1   FOTO: 1/1    Precautions: Standard     Time In: 0800  Time Out: 0900  Total Appointment Time (timed & untimed codes): 60 minutes    Subjective     Date of onset: 4 months    History of current condition - Albert reports: reports pain along Right posterior hip/hamstring. Plays a lot of tennis, squash. Squash 3 x week, weight lifting 2-3 x week.  Also rides peloton.  1 x week for about 30 mins  States he is starting to have to lower back pain at times.  He denies N/T, denies radiating pain in to legs, no changes in b/b. Denies crepitus, giving out.     Falls: none    Imaging: :   EXAMINATION:  XR FEMUR 2 VIEW LEFT     CLINICAL HISTORY:  Other specified disorders of bone, thigh     TECHNIQUE:  AP and lateral views of the left femur were performed.     COMPARISON:  None}     FINDINGS:  No fracture or dislocation.  No bone destruction identified.     Impression:     See above       Prior Therapy: OMT Dr. Waterman  Social History:  lives with their family  Occupation: banking.   Prior Level of Function: independent, pain free activities.   Current Level of Function: limited due to pain    Pain:  Current 0/10, worst 8/10, best 0/10   Location: right hip, hamstring    Description: Aching and Dull  Aggravating Factors: lunging, deep squat  Easing Factors: rest, ice    Patients goals: return to sport, strengthen muscle     Medical History:   No past medical history on  file.    Surgical History:   Mumtaz Barragan  has no past surgical history on file.    Medications:   Mumtaz has a current medication list which includes the following prescription(s): meloxicam.    Allergies:   Review of patient's allergies indicates:   Allergen Reactions    Penicillins Hives and Rash        Objective      Observation: pt presents in gym. No distress noted. No bruising or deformity noted    Posture:  no significant deviation noted in stance    Functional Movements:   Gait: independent, non painful  Squat: functional, non pain ful  Lunges: pain with deep lunge R side.  SLS: no significant deviations noted bradley      Lumbar AROM Pain/Dysfunction with Movement:   Flexion 100%    Extension 100%    Right %    Left %    Right rotation 75% discomfort   Left rotation 100%      Hip Right  Left  Pain/Dysfunction with Movement    AROM PROM AROM PROM    Flexion 110 120 110 120    Extension 7 10 7 10    Abduction 40 45 40 45    External rotation 40 45 40 45    Internal Rotation 30 35 30 35       Full AROM bradley Knees    LE MMT Right Left Pain/Dysfunction with Movement   Hip flexion 5/5 5/5    Hip extension 4+/5 4+/5    Hip abduction 4/5 4/5    Hip external rotation 4/5 4/5    Knee flexion 5/5 5/5    Knee extension 5/5 5/5    Ankle DF 5 5    Ankle PF 5 5    Ankle Inv 5 5    Ankle Ev 5 5    Great Toe Ext 5 5      Hamstring stretch HHD ft-lbs   Right* left % deficit   20 deg 34.1 44.9 24%   45 deg 32.8 45.5 27%   90 deg 38.3 50.2 23.%       Flexibility:               Ely's test: R - ; L +-              Hamstrings: R 30 deg; L  + 20 deg  Matthew Test Right  Left    Iliopsoas tigh tight   Rectus Femoris  tight tight       SIJ  Right  Left    Thigh Thrust N N   Compression N N   Distraction N N   Sacral Thrust N N      Right  Left    RITO N N   PREM N N   Log Roll N N   Hip Scour N N     Neural Tension Testing:   Slump:NEG  SLR: NEG        Palpation: TPP ischial tub R side      Limitation/Restriction for FOTO   Survey    Therapist reviewed FOTO scores for Mumtaz Barragan on 10/23/2024.   FOTO documents entered into [employer] - see Media section.    Limitation Score: see media         Treatment     Total Treatment time (time-based codes) separate from Evaluation: 23 minutes      Albert received the treatments listed below:      therapeutic exercises to develop strength and ROM for 23 minutes including:  Issued hep listed in pt instructions      Patient Education and Home Exercises     Education provided:   - issued hep, timeline    Written Home Exercises Provided: yes. Exercises were reviewed and Albert was able to demonstrate them prior to the end of the session.  Albert demonstrated good  understanding of the education provided. See EMR under Patient Instructions for exercises provided during therapy sessions.    Assessment     Mumtaz is a 33 y.o. male referred to outpatient Physical Therapy with a medical diagnosis of   M76.899 (ICD-10-CM) - Hamstring tendinitis   . Patient presents with posterior hip /hamstring pain, weakness, decreased functional mobility with symptoms consistent of proximal hamstring tendinopathy. Pt would benefit from skilled PT in order to maximize function.     Patient prognosis is Excellent.   Patient will benefit from skilled outpatient Physical Therapy to address the deficits stated above and in the chart below, provide patient /family education, and to maximize patientt's level of independence.     Plan of care discussed with patient: Yes  Patient's spiritual, cultural and educational needs considered and patient is agreeable to the plan of care and goals as stated below:     Anticipated Barriers for therapy: none    Medical Necessity is demonstrated by the following  History  Co-morbidities and personal factors that may impact the plan of care [x] LOW: no personal factors / co-morbidities  [] MODERATE: 1-2 personal factors / co-morbidities  [] HIGH: 3+ personal factors / co-morbidities     Moderate / High  Support Documentation:   Co-morbidities affecting plan of care: NA     Personal Factors:   no deficits      Examination  Body Structures and Functions, activity limitations and participation restrictions that may impact the plan of care [x] LOW: addressing 1-2 elements  [] MODERATE: 3+ elements  [] HIGH: 4+ elements (please support below)     Moderate / High Support Documentation: NA      Clinical Presentation [x] LOW: stable  [] MODERATE: Evolving  [] HIGH: Unstable      Decision Making/ Complexity Score: low         Goals:  Short Term Goals: 8 weeks   Pt independent in initial hep  Pain 0/10  Equal hamstring length  Hamstring strength 90% or better  Pt returns to squash with modifications    Long Term Goals: 16 weeks   Pt independent in d/c hep  Pain 0/10  Hamstring strength 100% or better  Pt return to working out, squash without limitations  Pt reports 90% improvement in function.  Plan     Plan of care Certification: 10/23/2024 to 3/1/25.    Outpatient Physical Therapy 1-3 times weekly for 16 weeks to include the following interventions: Electrical Stimulation NMES, IFC, DN, Manual Therapy, Moist Heat/ Ice, Neuromuscular Re-ed, Patient Education, Therapeutic Activities, and Therapeutic Exercise.     Darien Whitlock, PT

## 2024-10-30 ENCOUNTER — TELEPHONE (OUTPATIENT)
Dept: SPORTS MEDICINE | Facility: CLINIC | Age: 34
End: 2024-10-30
Payer: COMMERCIAL

## 2024-11-01 ENCOUNTER — CLINICAL SUPPORT (OUTPATIENT)
Dept: REHABILITATION | Facility: HOSPITAL | Age: 34
End: 2024-11-01
Payer: COMMERCIAL

## 2024-11-01 DIAGNOSIS — R53.1 WEAKNESS: ICD-10-CM

## 2024-11-01 DIAGNOSIS — S76.311A STRAIN OF RIGHT HAMSTRING MUSCLE, INITIAL ENCOUNTER: Primary | ICD-10-CM

## 2024-11-01 PROCEDURE — 97112 NEUROMUSCULAR REEDUCATION: CPT | Performed by: PHYSICAL THERAPIST

## 2024-11-01 PROCEDURE — 97110 THERAPEUTIC EXERCISES: CPT | Performed by: PHYSICAL THERAPIST

## 2024-11-12 ENCOUNTER — OFFICE VISIT (OUTPATIENT)
Dept: SPORTS MEDICINE | Facility: CLINIC | Age: 34
End: 2024-11-12
Payer: COMMERCIAL

## 2024-11-12 ENCOUNTER — CLINICAL SUPPORT (OUTPATIENT)
Dept: REHABILITATION | Facility: HOSPITAL | Age: 34
End: 2024-11-12
Payer: COMMERCIAL

## 2024-11-12 VITALS
HEIGHT: 76 IN | WEIGHT: 221.56 LBS | BODY MASS INDEX: 26.98 KG/M2 | SYSTOLIC BLOOD PRESSURE: 135 MMHG | DIASTOLIC BLOOD PRESSURE: 87 MMHG | HEART RATE: 54 BPM

## 2024-11-12 DIAGNOSIS — M99.03 SOMATIC DYSFUNCTION OF LUMBAR REGION: ICD-10-CM

## 2024-11-12 DIAGNOSIS — M99.08 SOMATIC DYSFUNCTION OF RIB CAGE REGION: ICD-10-CM

## 2024-11-12 DIAGNOSIS — M54.59 MECHANICAL LOW BACK PAIN: Primary | ICD-10-CM

## 2024-11-12 DIAGNOSIS — M99.05 SOMATIC DYSFUNCTION OF PELVIS REGION: ICD-10-CM

## 2024-11-12 DIAGNOSIS — M99.02 SOMATIC DYSFUNCTION OF THORACIC REGION: ICD-10-CM

## 2024-11-12 DIAGNOSIS — M76.899 HAMSTRING TENDINITIS: ICD-10-CM

## 2024-11-12 DIAGNOSIS — R53.1 WEAKNESS: ICD-10-CM

## 2024-11-12 DIAGNOSIS — R29.898 POOR BODY MECHANICS: ICD-10-CM

## 2024-11-12 DIAGNOSIS — M99.04 SOMATIC DYSFUNCTION OF SACRAL REGION: ICD-10-CM

## 2024-11-12 DIAGNOSIS — M99.07 SOMATIC DYSFUNCTION OF UPPER EXTREMITY: ICD-10-CM

## 2024-11-12 DIAGNOSIS — M99.06 SOMATIC DYSFUNCTION OF LOWER EXTREMITY: ICD-10-CM

## 2024-11-12 DIAGNOSIS — S76.311A STRAIN OF RIGHT HAMSTRING MUSCLE, INITIAL ENCOUNTER: Primary | ICD-10-CM

## 2024-11-12 PROCEDURE — 99999 PR PBB SHADOW E&M-EST. PATIENT-LVL III: CPT | Mod: PBBFAC,,, | Performed by: ORTHOPAEDIC SURGERY

## 2024-11-12 PROCEDURE — 1160F RVW MEDS BY RX/DR IN RCRD: CPT | Mod: CPTII,S$GLB,, | Performed by: ORTHOPAEDIC SURGERY

## 2024-11-12 PROCEDURE — 3079F DIAST BP 80-89 MM HG: CPT | Mod: CPTII,S$GLB,, | Performed by: ORTHOPAEDIC SURGERY

## 2024-11-12 PROCEDURE — 1159F MED LIST DOCD IN RCRD: CPT | Mod: CPTII,S$GLB,, | Performed by: ORTHOPAEDIC SURGERY

## 2024-11-12 PROCEDURE — 98928 OSTEOPATH MANJ 7-8 REGIONS: CPT | Mod: S$GLB,,, | Performed by: ORTHOPAEDIC SURGERY

## 2024-11-12 PROCEDURE — 97110 THERAPEUTIC EXERCISES: CPT | Performed by: PHYSICAL THERAPIST

## 2024-11-12 PROCEDURE — 3075F SYST BP GE 130 - 139MM HG: CPT | Mod: CPTII,S$GLB,, | Performed by: ORTHOPAEDIC SURGERY

## 2024-11-12 PROCEDURE — 99213 OFFICE O/P EST LOW 20 MIN: CPT | Mod: 25,S$GLB,, | Performed by: ORTHOPAEDIC SURGERY

## 2024-11-12 PROCEDURE — 97112 NEUROMUSCULAR REEDUCATION: CPT | Performed by: PHYSICAL THERAPIST

## 2024-11-12 PROCEDURE — 3008F BODY MASS INDEX DOCD: CPT | Mod: CPTII,S$GLB,, | Performed by: ORTHOPAEDIC SURGERY

## 2024-11-12 NOTE — PROGRESS NOTES
CC: bilateral hamstring, left low back pain     Albert is her today for follow up evaluation of his bilateral hamstring and left low back pain. Patient reports his pain is 2/10 today and states he is feeling 30% improved at this time which he attributes to OMT, compliance with his HEP, and physical therapy. He denies new falls or trauma since his last visit.     Recall from visit on 10/10/2024  33 y.o. Male presents today for evaluation of his bilateral hamstring and left low back pain. He was referred by Dr. Mathews for consideration of possible OMT. He admits to right proximal hamstring pain beginning a few months ago he attributes to a few days in a row of playing squash and HIIT training on the bike. He also admits to waking up with left low back and left proximal hamstring pain beginning 1.5 weeks ago without mechanism of injury.   How long: Months  What makes it better: Rest  What makes it worse: Exercise activity   Does it radiate: Denies  Attempted treatments: Rest, ice,   Pain score: 2/10   Any mechanical symptoms: Denies  Feelings of instability: Denies   Affecting ADLs: Denies     Occupation: Banking     PAST MEDICAL HISTORY:   History reviewed. No pertinent past medical history.    PAST SURGICAL HISTORY:   History reviewed. No pertinent surgical history.    FAMILY HISTORY:   No family history on file.    SOCIAL HISTORY:   Social History     Socioeconomic History    Marital status: Single     Social Drivers of Health     Financial Resource Strain: Patient Declined (10/8/2024)    Overall Financial Resource Strain (CARDIA)     Difficulty of Paying Living Expenses: Patient declined   Food Insecurity: Patient Declined (10/8/2024)    Hunger Vital Sign     Worried About Running Out of Food in the Last Year: Patient declined     Ran Out of Food in the Last Year: Patient declined   Physical Activity: Sufficiently Active (10/8/2024)    Exercise Vital Sign     Days of Exercise per Week: 6 days     Minutes of Exercise  "per Session: 40 min   Stress: No Stress Concern Present (10/8/2024)    [employer] - Occupational Stress Questionnaire     Feeling of Stress : Not at all   Housing Stability: Unknown (10/8/2024)    Housing Stability Vital Sign     Unable to Pay for Housing in the Last Year: No       MEDICATIONS:     Current Outpatient Medications:     meloxicam (MOBIC) 15 MG tablet, Take 1 tablet (15 mg total) by mouth once daily., Disp: 30 tablet, Rfl: 1    ALLERGIES:   Review of patient's allergies indicates:   Allergen Reactions    Penicillins Hives and Rash        PHYSICAL EXAMINATION:  /87 (Patient Position: Sitting)   Pulse (!) 54   Ht 6' 4" (1.93 m)   Wt 100.5 kg (221 lb 9 oz)   BMI 26.97 kg/m²   Vitals signs and nursing note have been reviewed.  General: In no acute distress, well developed, well nourished, no diaphoresis  Eyes: EOM full and smooth, no eye redness or discharge  HENT: normocephalic and atraumatic, neck supple, trachea midline, no nasal discharge, no external ear redness or discharge  Cardiovascular: no LE edema  Lungs: respirations non-labored, no conversational dyspnea   Abd: non-distended, no rigidity  MSK: no amputation or deformity, no swelling of extremities  Neuro: AAOx3, CN2-12 grossly intact  Skin: No rashes, warm and dry  Psychiatric: cooperative, pleasant, mood and affect appropriate for age    Lumbar Spine: bilateral lumbar region    Observation:    Normal cervicothoracolumbar curves.    No obvious pelvic obliquity while standing.    No edema, erythema, or ecchymosis noted in lumbosacral region.    No midline skin abnormalities.    No atrophy of lower limb musculature.    Tenderness:  No tenderness throughout the lumbar spine, iliolumbar region, posterior pelvis.  No tenderness over the sacrum, piriformis, greater/lesser trochanters.  + minimal tenderness over the right proximal hamstring tendon and muscle  No bony deformities or step-offs palpated.     Range " of Motion:  Active flexion to 60°.   Active extension to 25°.   Active rotation to 30° bilaterally.    Active sidebending to 25° bilaterally.  Passive hip flexion to 135° bilaterally.    Passive hip internal rotation to 45° bilaterally.    Passive hip external rotation to 45° bilaterally.    All ROM testing is without pain.    Strength Testing:  Hip flexion - 5/5  Hip extension - 5/5  Knee flexion - 5/5  Knee extension - 5/5  Dorsiflexion - 5/5  Plantarflexion - 5/5  Great toe extension - 5/5    Special Tests:  Seated straight leg raise - negative  Supine straight leg raise - negative  Slump test - negative  Provocation maneuvers exhibit no worsening of symptoms    PREM test - negative  FADIR test - negative  Log roll test - negative    Negative Pheasant test.  Matthew test normal bilaterally.      Posture:  Upright and Anterior pelvic tilt with increased lumbar lordosis  Gait: Non-antalgic     TART (Tissue texture abnormality, Asymmetry,  Restriction of motion and/or Tenderness) changes:    Head:      Cervical Spine  Thoracic Spine  Lumbar Spine   C1 Neutral T1 Neutral L1 Neutral   C2 Neutral T2 Neutral L2 Neutral   C3 Neutral T3 Neutral L3 Neutral   C4 Neutral T4 NSLRR L4 ERSR   C5 Neutral T5 NSLRR L5 ERSR   C6 Neutral T6 NSLRR     C7 Neutral T7 NSLRR       T8 Neutral       T9 Neutral       T10 Neutral       T11 ERSL       T12 FRSL       Ribs:  Inhalation restriction of the left lower ribcage  External torsion ribs 10-11 left    Upper Extremity:  Periscapular MFR left    Abdomen:    Pelvis:  Innominate:Left anterior rotation  Pubic bone:Left interior pubic shear    Sacrum:Left on Right sacral torsion    Lower Extremity:  Internal tibial torsion on the left      Key   F= Flexed   E = Extended   R = Rotated   N = Neutral   S = Sidebent   TTA = tissue texture abnormality   L/R/B (last letter) = left/right/bilateral   HTP = fascial herniated trigger point   TB = fascial trigger band   CD = fascial continuum distortion    MFR = myofascial restriction       Neurovascular Exam:  Sensation intact to light touch in the L2-S2 dermatomes bilaterally.   No pretibial edema or abnormal hair pattern of the shin.    Intact and symmetric DP and PT pulses bilaterally.  Normal gait without trendelenberg, heel walking, toe walking, and tandem walking.      IMAGIN. X-ray obtained 10/09/2024 due to right hamstring pain   2. X-ray images were reviewed personally by me and then directly with patient.  3. FINDINGS: X-ray images obtained demonstrate no fracture or dislocation   4. IMPRESSION: As above.       ASSESSMENT:      ICD-10-CM ICD-9-CM   1. Mechanical low back pain  M54.59 724.2   2. Hamstring tendinitis  M76.899 727.09   3. Poor body mechanics  R29.898 781.99   4. Somatic dysfunction of lumbar region  M99.03 739.3   5. Somatic dysfunction of pelvis region  M99.05 739.5   6. Somatic dysfunction of upper extremity  M99.07 739.7   7. Somatic dysfunction of lower extremity  M99.06 739.6   8. Somatic dysfunction of sacral region  M99.04 739.4   9. Somatic dysfunction of thoracic region  M99.02 739.2   10. Somatic dysfunction of rib cage region  M99.08 739.8           PLAN:  1-3. Mechanical LBP/hamstring/poor body mechanics - improved    - Albert admits to left proximal hamstring pain beginning several months ago he attributes to playing squash for a few days in a row and HIIT training on the bike. He also notes waking up with left sided low back pain 1.5 weeks ago in addition to left proximal hamstring pain. He was referred by Dr. Mathews for consideration of possible OMT.      - He is now feeling 30% improved which he attributes to OMT, compliance with his HEP and physical therapy. He is pleased with his progress at this time.     - Based on his description of pain/body language and somatic dysfunction identified on exam, I discussed osteopathic manipulation as a treatment option today. He consents to evaluation and treatment. See below.    -  Continue with his HEP for abdominal bracing, ant marches, diaphragmatic breathing, pelvic clocks 6-12, hamstring protocol prescribed at initial visit.     - Continue with physical therapy as scheduled.       4-10. Somatic dysfunction of lumbar, pelvis, sacral, thoracic, lower extremity, upper extremity, ribcage regions -     - OMT 7-8 regions. Oral consent obtained. Reviewed benefits and potential side effects. OMT indicated today due to signs and symptoms as well as local and remote somatic dysfunction findings and their related neurokinetic, lymphatic, fascial and/or arteriovenous body connections. OMT techniques used: Soft Tissue, Myofascial Release, Muscle Energy, and Fascial Distortion Model. Treatment was tolerated well. Improvement noted in segmental mobility post-treatment in dysfunctional regions. There were no adverse events and no complications immediately following treatment. Advised plenty of water to help alleviate soreness.      Future planning includes - possibly more OMT if helpful and if indicated, PT modifications, advanced imaging if not improving    All questions were answered to the best of my ability and all concerns were addressed at this time.    Follow up in 8 weeks for above, or sooner if needed.      This note is dictated using the M*Modal Fluency Direct word recognition program. There are word recognition mistakes that are occasionally missed on review.

## 2024-11-12 NOTE — PROGRESS NOTES
[facility]   Physical Therapy Treatment Note      Name: Mumtaz Barragan  [facility] Number: [id number]    Therapy Diagnosis:   Encounter Diagnoses   Name Primary?    Strain of right hamstring muscle, initial encounter Yes    Weakness      Physician: João Mathews MD    Visit Date: 11/12/2024    Physician Orders: PT Eval and Treat   Medical Diagnosis from Referral:   Diagnosis   M76.899 (ICD-10-CM) - Hamstring tendinitis      Evaluation Date: 10/23/2024  Authorization Period Expiration: 12/31/24  Plan of Care Expiration: 12/31/24  Progress Note Due: 11/30/24  Visit # / Visits authorized: 1/ 1   FOTO: 1/1     Precautions: Standard      Time In: 1800  Time Out: 1900  Total Appointment Time (timed & untimed codes): 60 minutes  Subjective     Pt reports: doing well. Hamstrings feeling strong.   He was compliant with home exercise program.  Response to previous treatment: n/a  Functional change: n/a    Pain: 3/10  Location: right hamstring     Objective      Objective Measures updated at progress report unless specified.     Treatment     Albert received the treatments listed below:      therapeutic exercises to develop strength and ROM for 15 minutes including:  Elliptical 8 mins  Lower back hip warm up, prone, supine scorpion    manual therapy techniques:  were applied to the:  for  minutes, including:      neuromuscular re-education activities to improve: muscle re ed for 38 minutes. The following activities were included:  5 sets 45 sec on with 2 mins rest, super set planks  Hamstring bridge on floor at 90, 45, and 15 deg  Hip hinge with dowel 3 x 10 3 sec hold  5 x 5 x 5 sec eccentric h/s bridge    therapeutic activities to improve functional performance for   minutes, including:      Patient Education and Home Exercises       Education provided:   - reviewed hep    Written Home Exercises Provided: yes. Exercises were reviewed and Albert was able to demonstrate them prior to the end of the  session.  Albert demonstrated good  understanding of the education provided. See EMR under Patient Instructions for exercises provided during therapy sessions    Assessment     Albert did well today. Started proximal h/s loading with work in hip hinge. Does well with cues. Tolerated eccentric h/s work well. Will reassess next visit, possible slow proximal h/s loading.     Albert Is progressing well towards his goals.   Pt prognosis is Excellent.     Pt will continue to benefit from skilled outpatient physical therapy to address the deficits listed in the problem list box on initial evaluation, provide pt/family education and to maximize pt's level of independence in the home and community environment.     Pt's spiritual, cultural and educational needs considered and pt agreeable to plan of care and goals.     Anticipated barriers to physical therapy: none  Goals:  Short Term Goals: 8 weeks   Pt independent in initial hep  Pain 0/10  Equal hamstring length  Hamstring strength 90% or better  Pt returns to squash with modifications     Long Term Goals: 16 weeks   Pt independent in d/c hep  Pain 0/10  Hamstring strength 100% or better  Pt return to working out, squash without limitations  Pt reports 90% improvement in function.  Plan      Plan of care Certification: 10/23/2024 to 3/1/25.     Outpatient Physical Therapy 1-3 times weekly for 16 weeks to include the following interventions: Electrical Stimulation NMES, IFC, DN, Manual Therapy, Moist Heat/ Ice, Neuromuscular Re-ed, Patient Education, Therapeutic Activities, and Therapeutic Exercise.      Darien Whitlock, PT

## 2024-11-26 ENCOUNTER — CLINICAL SUPPORT (OUTPATIENT)
Dept: REHABILITATION | Facility: HOSPITAL | Age: 34
End: 2024-11-26
Payer: COMMERCIAL

## 2024-11-26 DIAGNOSIS — S76.311A STRAIN OF RIGHT HAMSTRING MUSCLE, INITIAL ENCOUNTER: Primary | ICD-10-CM

## 2024-11-26 DIAGNOSIS — R53.1 WEAKNESS: ICD-10-CM

## 2024-11-26 PROCEDURE — 97110 THERAPEUTIC EXERCISES: CPT | Performed by: PHYSICAL THERAPIST

## 2024-11-26 PROCEDURE — 97112 NEUROMUSCULAR REEDUCATION: CPT | Performed by: PHYSICAL THERAPIST

## 2024-11-26 NOTE — PROGRESS NOTES
[facility]   Physical Therapy Treatment Note      Name: Mumtaz Barragan  [facility] Number: [id number]    Therapy Diagnosis:   Encounter Diagnoses   Name Primary?    Strain of right hamstring muscle, initial encounter Yes    Weakness      Physician: João Mathews MD    Visit Date: 11/26/2024    Physician Orders: PT Eval and Treat   Medical Diagnosis from Referral:   Diagnosis   M76.899 (ICD-10-CM) - Hamstring tendinitis      Evaluation Date: 10/23/2024  Authorization Period Expiration: 12/31/24  Plan of Care Expiration: 12/31/24  Progress Note Due: 11/30/24  Visit # / Visits authorized: 3/20   FOTO: 1/1     Precautions: Standard      Time In: 1800  Time Out: 1900  Total Appointment Time (timed & untimed codes): 60 minutes  Subjective     Pt reports: feels like he is making good progress.   He was compliant with home exercise program.  Response to previous treatment: n/a  Functional change: n/a    Pain: 0-1/10  Location: right hamstring     Objective      Objective Measures updated at progress report unless specified.     Treatment     Albert received the treatments listed below:      therapeutic exercises to develop strength and ROM for 15 minutes including:  Elliptical 8 mins  Lower back hip warm up, prone, supine scorpion    manual therapy techniques:  were applied to the:  for  minutes, including:      neuromuscular re-education activities to improve: muscle re ed for 38 minutes. The following activities were included:  1 sets 45 sec on super set with 10 hip hinges  Hamstring bridge on floor at 90, 45, and 15 deg    Static lunge 2 x 10 slow  Slider reverse lunge 3 x 10  Slider lateral lunge 3 x 10    therapeutic activities to improve functional performance for   minutes, including:      Patient Education and Home Exercises       Education provided:   - reviewed hep    Written Home Exercises Provided: yes. Exercises were reviewed and Albert was able to demonstrate them prior to the end of  the session.  Albert demonstrated good  understanding of the education provided. See EMR under Patient Instructions for exercises provided during therapy sessions    Assessment     Albert did well today. Tolerates tempo work well. Will load this next time and reassess.     Albert Is progressing well towards his goals.   Pt prognosis is Excellent.     Pt will continue to benefit from skilled outpatient physical therapy to address the deficits listed in the problem list box on initial evaluation, provide pt/family education and to maximize pt's level of independence in the home and community environment.     Pt's spiritual, cultural and educational needs considered and pt agreeable to plan of care and goals.     Anticipated barriers to physical therapy: none  Goals:  Short Term Goals: 8 weeks   Pt independent in initial hep  Pain 0/10  Equal hamstring length  Hamstring strength 90% or better  Pt returns to squash with modifications     Long Term Goals: 16 weeks   Pt independent in d/c hep  Pain 0/10  Hamstring strength 100% or better  Pt return to working out, squash without limitations  Pt reports 90% improvement in function.  Plan      Plan of care Certification: 10/23/2024 to 3/1/25.     Outpatient Physical Therapy 1-3 times weekly for 16 weeks to include the following interventions: Electrical Stimulation NMES, IFC, DN, Manual Therapy, Moist Heat/ Ice, Neuromuscular Re-ed, Patient Education, Therapeutic Activities, and Therapeutic Exercise.      Darien Whitlock, PT

## 2024-12-12 ENCOUNTER — CLINICAL SUPPORT (OUTPATIENT)
Dept: REHABILITATION | Facility: HOSPITAL | Age: 34
End: 2024-12-12
Payer: COMMERCIAL

## 2024-12-12 DIAGNOSIS — R53.1 WEAKNESS: ICD-10-CM

## 2024-12-12 DIAGNOSIS — S76.311A STRAIN OF RIGHT HAMSTRING MUSCLE, INITIAL ENCOUNTER: Primary | ICD-10-CM

## 2024-12-12 PROCEDURE — 97110 THERAPEUTIC EXERCISES: CPT | Performed by: PHYSICAL THERAPIST

## 2024-12-12 PROCEDURE — 97112 NEUROMUSCULAR REEDUCATION: CPT | Performed by: PHYSICAL THERAPIST

## 2024-12-16 NOTE — PROGRESS NOTES
[facility]   Physical Therapy Treatment Note      Name: Mumtaz Barragan  [facility] Number: [id number]    Therapy Diagnosis:   Encounter Diagnoses   Name Primary?    Strain of right hamstring muscle, initial encounter Yes    Weakness      Physician: João Mathews MD    Visit Date: 12/12/2024    Physician Orders: PT Eval and Treat   Medical Diagnosis from Referral:   Diagnosis   M76.899 (ICD-10-CM) - Hamstring tendinitis      Evaluation Date: 10/23/2024  Authorization Period Expiration: 12/31/24  Plan of Care Expiration: 12/31/24  Progress Note Due: 11/30/24  Visit # / Visits authorized: 3/20   FOTO: 1/1     Precautions: Standard      Time In: 1700  Time Out: 1800  Total Appointment Time (timed & untimed codes): 60 minutes  Subjective     Pt reports: fdoing well. Little to no pain.   He was compliant with home exercise program.  Response to previous treatment: n/a  Functional change: n/a    Pain: 0/10  Location: right hamstring     Objective      Objective Measures updated at progress report unless specified.     Treatment     Albert received the treatments listed below:      therapeutic exercises to develop strength and ROM for 15 minutes including:  Elliptical 8 mins  Lower back hip warm up, prone, supine scorpion    manual therapy techniques:  were applied to the:  for  minutes, including:      neuromuscular re-education activities to improve: muscle re ed for 38 minutes. The following activities were included:  1 sets 45 sec on super set with 10 hip hinges  Hamstring bridge on floor at 90, 45, and 15 deg  Hip hinging  Static lunge 2 x 10 slow  Slider reverse lunge 3 x 10  Slider lateral lunge 3 x 10  Double leg RDL 20 lbs 3 x 10  therapeutic activities to improve functional performance for   minutes, including:      Patient Education and Home Exercises       Education provided:   - reviewed hep    Written Home Exercises Provided: yes. Exercises were reviewed and Albert was able to  demonstrate them prior to the end of the session.  Albert demonstrated good  understanding of the education provided. See EMR under Patient Instructions for exercises provided during therapy sessions    Assessment     Albert did well today. Tolerating eccentric loading well with no pain. Updated hep. Will reassess next visit for more dynamic loading.     Albert Is progressing well towards his goals.   Pt prognosis is Excellent.     Pt will continue to benefit from skilled outpatient physical therapy to address the deficits listed in the problem list box on initial evaluation, provide pt/family education and to maximize pt's level of independence in the home and community environment.     Pt's spiritual, cultural and educational needs considered and pt agreeable to plan of care and goals.     Anticipated barriers to physical therapy: none  Goals:  Short Term Goals: 8 weeks   Pt independent in initial hep  Pain 0/10  Equal hamstring length  Hamstring strength 90% or better  Pt returns to squash with modifications     Long Term Goals: 16 weeks   Pt independent in d/c hep  Pain 0/10  Hamstring strength 100% or better  Pt return to working out, squash without limitations  Pt reports 90% improvement in function.  Plan      Plan of care Certification: 10/23/2024 to 3/1/25.     Outpatient Physical Therapy 1-3 times weekly for 16 weeks to include the following interventions: Electrical Stimulation NMES, IFC, DN, Manual Therapy, Moist Heat/ Ice, Neuromuscular Re-ed, Patient Education, Therapeutic Activities, and Therapeutic Exercise.      Darien Whitlock, PT, DPT

## 2024-12-27 ENCOUNTER — OFFICE VISIT (OUTPATIENT)
Dept: URGENT CARE | Facility: CLINIC | Age: 34
End: 2024-12-27
Payer: COMMERCIAL

## 2024-12-27 VITALS
SYSTOLIC BLOOD PRESSURE: 131 MMHG | RESPIRATION RATE: 20 BRPM | HEIGHT: 76 IN | BODY MASS INDEX: 26.91 KG/M2 | TEMPERATURE: 98 F | WEIGHT: 221 LBS | DIASTOLIC BLOOD PRESSURE: 86 MMHG | OXYGEN SATURATION: 99 % | HEART RATE: 78 BPM

## 2024-12-27 DIAGNOSIS — J34.89 NASAL CONGESTION WITH RHINORRHEA: ICD-10-CM

## 2024-12-27 DIAGNOSIS — J02.9 SORE THROAT: ICD-10-CM

## 2024-12-27 DIAGNOSIS — J06.9 UPPER RESPIRATORY INFECTION WITH COUGH AND CONGESTION: Primary | ICD-10-CM

## 2024-12-27 DIAGNOSIS — R09.81 NASAL CONGESTION WITH RHINORRHEA: ICD-10-CM

## 2024-12-27 LAB
CTP QC/QA: YES
CTP QC/QA: YES
MOLECULAR STREP A: NEGATIVE
POC MOLECULAR INFLUENZA A AGN: NEGATIVE
POC MOLECULAR INFLUENZA B AGN: NEGATIVE

## 2024-12-27 RX ORDER — FLUTICASONE PROPIONATE 50 MCG
2 SPRAY, SUSPENSION (ML) NASAL DAILY PRN
Qty: 15.8 ML | Refills: 0 | Status: SHIPPED | OUTPATIENT
Start: 2024-12-27 | End: 2025-01-26

## 2024-12-27 RX ORDER — KETOCONAZOLE 20 MG/ML
SHAMPOO, SUSPENSION TOPICAL 2 TIMES DAILY
COMMUNITY
Start: 2024-08-23

## 2024-12-27 RX ORDER — PREDNISONE 20 MG/1
20 TABLET ORAL 2 TIMES DAILY
Qty: 10 TABLET | Refills: 0 | Status: SHIPPED | OUTPATIENT
Start: 2024-12-27 | End: 2025-01-01

## 2024-12-27 RX ORDER — BROMPHENIRAMINE MALEATE, PSEUDOEPHEDRINE HYDROCHLORIDE, AND DEXTROMETHORPHAN HYDROBROMIDE 2; 30; 10 MG/5ML; MG/5ML; MG/5ML
10 SYRUP ORAL EVERY 4 HOURS PRN
Qty: 180 ML | Refills: 0 | Status: SHIPPED | OUTPATIENT
Start: 2024-12-27 | End: 2025-01-06

## 2024-12-27 NOTE — PATIENT INSTRUCTIONS
Recommend oral antihistamine (loratadine [Claritin]/cetrizine [Zyrtec]) +/- oral decongestant (pseudoephedrine) for rhinorrhea, steroid nasal spray (flonase), prescription/OTC cough medicine [brompheniramine-pseudoeph-DM (BROMFED DM) ] as needed during day before 8 pm,  Nyquil at night, Tylenol (Acetaminophen) and/or Motrin (Ibuprofen) as directed for control of pain and/or fever. Add Mucinex DM 12 hour   Bromphed contains pseudoephedrine so please do not take a separate oral decongestant (sudafed/pseudoephedrine) or stimulant (adderall/vyvanse) for ADHD.       Please drink plenty of fluids.  Please get plenty of rest.  Nasal irrigation with a saline spray or Netti Pot like device per their directions is also recommended.  If you  smoke, please stop smoking.    To help ease a sore throat, you can:  Use a sore throat spray.  Suck on hard candy or throat lozenges.  Gargle with warm saltwater a few times each day. Mix of 1/4 teaspoon (1.25 grams) salt in 8 ounces (240 mL) of warm water.  Use a cool mist humidifier to help you breathe easier.    If you negative (-) for a COVID test today and you are continuing to have symptoms, it is recommended to repeat the test in 48 hours x 3. If you continue to be negative, you may return to school/work once you have improved symptoms and no fever for 24 hours without any medications. This applies to all viral illnesses.       Discussed prescriptions and over-the-counter medicines to help with patient's symptoms:  A steroid nose spray (flonase) and antihistamine nasal spray (azelastine) can help with a stuffy nose. It can also help with drainage down the back of your throat.  An antihistamine (loratadine,zyrtec,allegra, xyzal) can help with itching, sneezing, or runny nose.  An antihistamine eye drop can help with itchy eyes.  A decongestant (pseudoephedrine,  Phenylephrine, oxymetazoline aka afrin nasal spray) can help with a stuffy nose. Take <10 days for congestion and  rhinorrhea. Once symptoms improve, proceed with loratadine/zyrtec once a day. These ingredients can keep you up all night, decrease appetite, feel jittery, and raise blood pressure with long term use.  OTC Coricidin can be used for patients with hypertension and palpitations because you cannot use ingredients such as pseudoephedrine and phenylephrine for oral decongestants.  Medications that control cough are suppressants and expectorants. Suppressants are tessalon pearls and dextromethorphan. If you have a productive cough with sputum, you need an expectorant called guaifenesin. Dextromethorphan and Guaifenesin are active ingredients in many OTC cough/cold medications such as Dayquil/Nyquil, Mucinex, and Robitussin Mucus+Chest Congestion.            Common Cold Medicine Ingredients Cheat sheet  Acetaminophen (APAP) -pain reliever/fever reducer  Dextromethorphan - cough suppressant  Guaifenesin - expectorant/thins and loosens mucus  Phenylephrine - nasal decongestant  Diphenhydramine or Doxylamine succinate - antihistamine, helps you fall asleep  Promethazine or Brompheniramine - Prescription strength antihistamines    These OTC cold medications are safe to use if you do not have high blood pressure (hypertension) or palpitations.  DayQuil and NyQuil - Cough, Cold & Flu Relief LiquiCaps  DayQuil: Acetaminophen, Dextromethorphan, and Phenylephrine   NyQuil: Acetaminophen, Dextromethorphan, and Doxylamine  DayQuil and NyQuil SEVERE Maximum Strength Cough, Cold & Flu Relief LiquiCaps  DAYQUIL: Acetaminophen, Dextromethorphan, Guaifenesin, and Phenylephrine  NYQUIL: Acetaminophen, Dextromethorphan, Doxylamine, and Phenylephrine   Mucinex DM: Guaifenesin,Dextromethorphan  Mucinex Maximum Strength Sinus-Max® Pressure, Pain & Cough Liquid Gels: Acetaminophen/Dextromethorphan/ Guaifenesin/Phenylephrine     If not allergic, take Tylenol (Acetaminophen) 650 mg to  1 g every 6 hours as needed  and/or Motrin (Ibuprofen) 600 to  800 mg every 6 hours as needed for fever or pain.    Discussed adverse side effects of recurrent/long term steroid use: elevated blood pressure elevated blood sugar, pancreatitis,glaucoma/cataracts, weight gain in face/abdomen/neck, round face (moon face), fluid retention in legs/lungs, mood swings, upset stomach, increased risk of infections, osteoporosis and increased risk of fractures, fatigue, loss of appetite, nausea and muscle weakness, thin skin, bruising and slower wound healing.          Please remember that you have received care at an urgent care today. Urgent cares are not emergency rooms and are not equipped to handle life threatening emergencies and cannot rule in or out certain medical conditions and you may be released before all of your medical problems are known or treated.     Please arrange follow up with your primary care physician or speciality clinic within 2-5 days if your signs and symptoms have not resolved or worsen.     Patient can call our Referral Hotline at [phone] to make an appointment.      Please return here or go to the Emergency Department for any concerns or worsening of condition.  Signs of infection. These include a fever of 100.4°F (38°C) or higher, chills, cough, more sputum or change in color of sputum.  You are having so much trouble breathing that you can only say one or two words at a time.  You need to sit upright at all times to be able to breathe and or cannot lie down.  You have trouble breathing when talking or sitting still.  You have a fever of 100.4°F (38°C) or higher or chills.  You have chest pain when you cough, have trouble breathing but can still talk in full sentences, or cough up blood.

## 2024-12-27 NOTE — PROGRESS NOTES
"Subjective:      Patient ID: Mumtaz Barragan is a 34 y.o. male.    Vitals:  height is 6' 4" (1.93 m) and weight is 100.2 kg (221 lb). His temperature is 98.2 °F (36.8 °C). His blood pressure is 131/86 and his pulse is 78. His respiration is 20 and oxygen saturation is 99%.     Chief Complaint: Sore Throat (Entered by patient)    Mumtaz Barragan is a 34 y.o. male who complains of  sore throat, congestion x2 days.  Pt states he has hx of strep throat.  Pt states he has not taken anything for his symptoms.  Pt states he has no known exposures to covid, flu, or strep.        Sore Throat   This is a new problem. The current episode started yesterday. The problem has been unchanged. Neither side of throat is experiencing more pain than the other. There has been no fever. The pain is at a severity of 6/10. The pain is moderate. Associated symptoms include congestion and coughing. Pertinent negatives include no abdominal pain, diarrhea, drooling, ear discharge, ear pain, headaches, hoarse voice, plugged ear sensation, neck pain, shortness of breath, stridor, swollen glands, trouble swallowing or vomiting. He has had no exposure to strep or mono. He has tried nothing for the symptoms. The treatment provided no relief.       Constitution: Negative for activity change, appetite change, chills, fatigue and fever.   HENT:  Positive for congestion, postnasal drip and sore throat. Negative for ear pain, ear discharge, foreign body in ear, drooling, sinus pain, sinus pressure, trouble swallowing and voice change.    Neck: Negative for neck pain.   Cardiovascular:  Negative for chest pain, leg swelling, palpitations and sob on exertion.   Respiratory:  Positive for cough. Negative for sputum production, shortness of breath, stridor, wheezing and asthma.    Gastrointestinal:  Negative for abdominal pain, nausea, vomiting and diarrhea.   Musculoskeletal:  Negative for muscle ache.   Allergic/Immunologic: Positive for environmental allergies " and seasonal allergies. Negative for asthma and recurrent sinus infections.   Neurological:  Negative for headaches.      Objective:     Physical Exam   Constitutional: He is oriented to person, place, and time. He is cooperative. No distress.      Comments:Patient is awake and alert, sitting up in exam chair, speaking and answering in complete sentences   normalawake  HENT:   Head: Normocephalic and atraumatic.      Comments: Patient observed breathing through his mouth, sniffling, and frequently clearing his throat.    Ears:   Right Ear: External ear and ear canal normal. A middle ear effusion is present.   Left Ear: External ear and ear canal normal. A middle ear effusion is present.   Nose: Mucosal edema, rhinorrhea and congestion present.   Mouth/Throat: Uvula is midline and mucous membranes are normal. Mucous membranes are moist. Posterior oropharyngeal erythema present. No oropharyngeal exudate. Tonsils are 0 on the right. Tonsils are 0 on the left. No tonsillar exudate. Oropharynx is clear.      Comments:  postnasal discharge noted on the posterior pharyngeal wall    Eyes: Conjunctivae and lids are normal. Pupils are equal, round, and reactive to light. Lids are everted and swept, no foreign bodies found. Extraocular movement intact vision grossly intact gaze aligned appropriately   Neck: Neck supple.   Cardiovascular: Normal rate, regular rhythm, normal heart sounds and normal pulses.   Pulmonary/Chest: Effort normal and breath sounds normal. No respiratory distress. He has no wheezes. He has no rhonchi. He has no rales.   Abdominal: Normal appearance.   Musculoskeletal: Normal range of motion.         General: Normal range of motion.      Cervical back: He exhibits tenderness.   Lymphadenopathy:     He has cervical adenopathy.   Neurological: He is alert and oriented to person, place, and time.   Skin: Skin is warm.   Psychiatric: His behavior is normal. Mood, judgment and thought content normal.   Nursing  note and vitals reviewed.      Assessment:     1. Upper respiratory infection with cough and congestion    2. Sore throat    3. Nasal congestion with rhinorrhea      Patient presents with clinical exam findings and history consistent with above.      On exam, patient is nontoxic appearing and vitals are stable.      Diagnostic testing results were reviewed and discussed with patient/guardian.   Tests ordered in clinic:  Results for orders placed or performed in visit on 12/27/24   POCT Strep A, Molecular    Collection Time: 12/27/24  1:13 PM   Result Value Ref Range    Molecular Strep A, POC Negative Negative     Acceptable Yes    POCT Influenza A/B MOLECULAR    Collection Time: 12/27/24  1:13 PM   Result Value Ref Range    POC Molecular Influenza A Ag Negative Negative    POC Molecular Influenza B Ag Negative Negative     Acceptable Yes        Previous progress notes/admissions/labs and medications were reviewed.      Plan:       Upper respiratory infection with cough and congestion  -     fluticasone propionate (FLONASE) 50 mcg/actuation nasal spray; 2 sprays (100 mcg total) by Each Nostril route daily as needed for Allergies or Rhinitis.  Dispense: 15.8 mL; Refill: 0  -     brompheniramine-pseudoeph-DM (BROMFED DM) 2-30-10 mg/5 mL Syrp; Take 10 mLs by mouth every 4 (four) hours as needed (cough, cold, and congestion).  Dispense: 180 mL; Refill: 0    Sore throat  -     POCT Strep A, Molecular  -     POCT Influenza A/B MOLECULAR  -     predniSONE (DELTASONE) 20 MG tablet; Take 1 tablet (20 mg total) by mouth 2 (two) times daily. for 5 days  Dispense: 10 tablet; Refill: 0    Nasal congestion with rhinorrhea  -     fluticasone propionate (FLONASE) 50 mcg/actuation nasal spray; 2 sprays (100 mcg total) by Each Nostril route daily as needed for Allergies or Rhinitis.  Dispense: 15.8 mL; Refill: 0  -     brompheniramine-pseudoeph-DM (BROMFED DM) 2-30-10 mg/5 mL Syrp; Take 10 mLs by mouth  "every 4 (four) hours as needed (cough, cold, and congestion).  Dispense: 180 mL; Refill: 0                    1) See orders for this visit as documented in the electronic medical record.  2) Symptomatic therapy suggested: use acetaminophen/ibuprofen every 6-8 hours prn pain or fever, push fluids.   3) Call or return to clinic prn if these symptoms worsen or fail to improve as anticipated.    Discussed results/diagnosis/plan with patient in clinic.  We had shared decision making for patient's treatment. Patient verbalized understanding and in agreement with current treatment plan.     Patient was instructed to return for re-evaluation with urgent care or PCP for continued outpatient workup and management if symptoms do not improve/worsening symptoms. Strict ED versus clinic precautions given in depth.    Discharge and follow-up instructions given verbally/printed with the patient who expressed understanding. The instructions and results are also available on [facility].              Taryn "Noman Malagon PA-C          Patient Instructions   Recommend oral antihistamine (loratadine [Claritin]/cetrizine [Zyrtec]) +/- oral decongestant (pseudoephedrine) for rhinorrhea, steroid nasal spray (flonase), prescription/OTC cough medicine [brompheniramine-pseudoeph-DM (BROMFED DM) ] as needed during day before 8 pm,  Nyquil at night, Tylenol (Acetaminophen) and/or Motrin (Ibuprofen) as directed for control of pain and/or fever. Add Mucinex DM 12 hour   Bromphed contains pseudoephedrine so please do not take a separate oral decongestant (sudafed/pseudoephedrine) or stimulant (adderall/vyvanse) for ADHD.       Please drink plenty of fluids.  Please get plenty of rest.  Nasal irrigation with a saline spray or Netti Pot like device per their directions is also recommended.  If you  smoke, please stop smoking.    To help ease a sore throat, you can:  Use a sore throat spray.  Suck on hard candy or throat lozenges.  Gargle with warm " saltwater a few times each day. Mix of 1/4 teaspoon (1.25 grams) salt in 8 ounces (240 mL) of warm water.  Use a cool mist humidifier to help you breathe easier.    If you negative (-) for a COVID test today and you are continuing to have symptoms, it is recommended to repeat the test in 48 hours x 3. If you continue to be negative, you may return to school/work once you have improved symptoms and no fever for 24 hours without any medications. This applies to all viral illnesses.       Discussed prescriptions and over-the-counter medicines to help with patient's symptoms:  A steroid nose spray (flonase) and antihistamine nasal spray (azelastine) can help with a stuffy nose. It can also help with drainage down the back of your throat.  An antihistamine (loratadine,zyrtec,allegra, xyzal) can help with itching, sneezing, or runny nose.  An antihistamine eye drop can help with itchy eyes.  A decongestant (pseudoephedrine,  Phenylephrine, oxymetazoline aka afrin nasal spray) can help with a stuffy nose. Take <10 days for congestion and rhinorrhea. Once symptoms improve, proceed with loratadine/zyrtec once a day. These ingredients can keep you up all night, decrease appetite, feel jittery, and raise blood pressure with long term use.  OTC Coricidin can be used for patients with hypertension and palpitations because you cannot use ingredients such as pseudoephedrine and phenylephrine for oral decongestants.  Medications that control cough are suppressants and expectorants. Suppressants are tessalon pearls and dextromethorphan. If you have a productive cough with sputum, you need an expectorant called guaifenesin. Dextromethorphan and Guaifenesin are active ingredients in many OTC cough/cold medications such as Dayquil/Nyquil, Mucinex, and Robitussin Mucus+Chest Congestion.            Common Cold Medicine Ingredients Cheat sheet  Acetaminophen (APAP) -pain reliever/fever reducer  Dextromethorphan - cough  suppressant  Guaifenesin - expectorant/thins and loosens mucus  Phenylephrine - nasal decongestant  Diphenhydramine or Doxylamine succinate - antihistamine, helps you fall asleep  Promethazine or Brompheniramine - Prescription strength antihistamines    These OTC cold medications are safe to use if you do not have high blood pressure (hypertension) or palpitations.  DayQuil and NyQuil - Cough, Cold & Flu Relief LiquiCaps  DayQuil: Acetaminophen, Dextromethorphan, and Phenylephrine   NyQuil: Acetaminophen, Dextromethorphan, and Doxylamine  DayQuil and NyQuil SEVERE Maximum Strength Cough, Cold & Flu Relief LiquiCaps  DAYQUIL: Acetaminophen, Dextromethorphan, Guaifenesin, and Phenylephrine  NYQUIL: Acetaminophen, Dextromethorphan, Doxylamine, and Phenylephrine   Mucinex DM: Guaifenesin,Dextromethorphan  Mucinex Maximum Strength Sinus-Max® Pressure, Pain & Cough Liquid Gels: Acetaminophen/Dextromethorphan/ Guaifenesin/Phenylephrine     If not allergic, take Tylenol (Acetaminophen) 650 mg to  1 g every 6 hours as needed  and/or Motrin (Ibuprofen) 600 to 800 mg every 6 hours as needed for fever or pain.    Discussed adverse side effects of recurrent/long term steroid use: elevated blood pressure elevated blood sugar, pancreatitis,glaucoma/cataracts, weight gain in face/abdomen/neck, round face (moon face), fluid retention in legs/lungs, mood swings, upset stomach, increased risk of infections, osteoporosis and increased risk of fractures, fatigue, loss of appetite, nausea and muscle weakness, thin skin, bruising and slower wound healing.          Please remember that you have received care at an urgent care today. Urgent cares are not emergency rooms and are not equipped to handle life threatening emergencies and cannot rule in or out certain medical conditions and you may be released before all of your medical problems are known or treated.     Please arrange follow up with your primary care physician or speciality  clinic within 2-5 days if your signs and symptoms have not resolved or worsen.     Patient can call our Referral Hotline at [phone] to make an appointment.      Please return here or go to the Emergency Department for any concerns or worsening of condition.  Signs of infection. These include a fever of 100.4°F (38°C) or higher, chills, cough, more sputum or change in color of sputum.  You are having so much trouble breathing that you can only say one or two words at a time.  You need to sit upright at all times to be able to breathe and or cannot lie down.  You have trouble breathing when talking or sitting still.  You have a fever of 100.4°F (38°C) or higher or chills.  You have chest pain when you cough, have trouble breathing but can still talk in full sentences, or cough up blood.

## 2024-12-27 NOTE — LETTER
"  December 27, 2024      [facility]  [address] [address]  Phone: [phone]  Fax: [phone]       Patient: Mumtaz Barragan   YOB: 1990  Date of Visit: 12/27/2024    To Whom It May Concern:    Sonia Barragan  was at [facility] on 12/27/2024. The patient may return to work/school on 12/28/24 with no restrictions. If you have any questions or concerns, or if I can be of further assistance, please do not hesitate to contact me.    Sincerely,        Tarynvin Malagon PA-C (Jackie)       "